# Patient Record
Sex: FEMALE | Race: BLACK OR AFRICAN AMERICAN | NOT HISPANIC OR LATINO | Employment: FULL TIME | URBAN - METROPOLITAN AREA
[De-identification: names, ages, dates, MRNs, and addresses within clinical notes are randomized per-mention and may not be internally consistent; named-entity substitution may affect disease eponyms.]

---

## 2018-08-23 ENCOUNTER — HISTORICAL (OUTPATIENT)
Dept: RADIOLOGY | Facility: HOSPITAL | Age: 28
End: 2018-08-23

## 2019-02-14 ENCOUNTER — HISTORICAL (OUTPATIENT)
Dept: LAB | Facility: HOSPITAL | Age: 29
End: 2019-02-14

## 2019-02-18 ENCOUNTER — HISTORICAL (OUTPATIENT)
Dept: ADMINISTRATIVE | Facility: HOSPITAL | Age: 29
End: 2019-02-18

## 2019-05-09 LAB
ABS NEUT (OLG): 2.7 X10(3)/MCL (ref 1.5–6.9)
ALBUMIN SERPL-MCNC: 3.8 GM/DL (ref 3.4–5)
ALBUMIN/GLOB SERPL: 1 RATIO
ALP SERPL-CCNC: 108 UNIT/L (ref 30–113)
ALT SERPL-CCNC: 29 UNIT/L (ref 10–45)
APTT PPP: 32.4 SECOND(S) (ref 25–35)
AST SERPL-CCNC: 15 UNIT/L (ref 15–37)
BASOPHILS # BLD AUTO: 0 X10(3)/MCL (ref 0–0.1)
BASOPHILS NFR BLD AUTO: 0 % (ref 0–1)
BILIRUB SERPL-MCNC: 0.3 MG/DL (ref 0.1–0.9)
BILIRUBIN DIRECT+TOT PNL SERPL-MCNC: 0.1 MG/DL (ref 0–0.3)
BILIRUBIN DIRECT+TOT PNL SERPL-MCNC: 0.2 MG/DL
BUN SERPL-MCNC: 12 MG/DL (ref 10–20)
CALCIUM SERPL-MCNC: 8.9 MG/DL (ref 8–10.5)
CHLORIDE SERPL-SCNC: 100 MMOL/L (ref 100–108)
CO2 SERPL-SCNC: 28 MMOL/L (ref 21–35)
CREAT SERPL-MCNC: 0.61 MG/DL (ref 0.7–1.3)
EOSINOPHIL # BLD AUTO: 0.2 X10(3)/MCL (ref 0–0.6)
EOSINOPHIL NFR BLD AUTO: 3 % (ref 0–5)
ERYTHROCYTE [DISTWIDTH] IN BLOOD BY AUTOMATED COUNT: 13.6 % (ref 11.5–17)
GLOBULIN SER-MCNC: 3.7 GM/DL
GLUCOSE SERPL-MCNC: 101 MG/DL (ref 75–116)
HCT VFR BLD AUTO: 37.8 % (ref 36–48)
HGB BLD-MCNC: 11.8 GM/DL (ref 12–16)
IMM GRANULOCYTES # BLD AUTO: 0.02 10*3/UL (ref 0–0.02)
IMM GRANULOCYTES NFR BLD AUTO: 0.3 % (ref 0–0.43)
INR PPP: 1 (ref 0–1.2)
LYMPHOCYTES # BLD AUTO: 2.7 X10(3)/MCL (ref 0.5–4.1)
LYMPHOCYTES NFR BLD AUTO: 45 % (ref 15–40)
MCH RBC QN AUTO: 27 PG (ref 27–34)
MCHC RBC AUTO-ENTMCNC: 31 GM/DL (ref 31–36)
MCV RBC AUTO: 87 FL (ref 80–99)
MONOCYTES # BLD AUTO: 0.4 X10(3)/MCL (ref 0–1.1)
MONOCYTES NFR BLD AUTO: 7 % (ref 4–12)
NEUTROPHILS # BLD AUTO: 2.7 X10(3)/MCL (ref 1.5–6.9)
NEUTROPHILS NFR BLD AUTO: 45 % (ref 43–75)
PLATELET # BLD AUTO: 156 X10(3)/MCL (ref 140–400)
PMV BLD AUTO: 9.6 FL (ref 6.8–10)
POTASSIUM SERPL-SCNC: 4.3 MMOL/L (ref 3.6–5.2)
PROT SERPL-MCNC: 7.5 GM/DL (ref 6.4–8.2)
PROTHROMBIN TIME: 10 SECOND(S) (ref 9–12)
RBC # BLD AUTO: 4.33 X10(6)/MCL (ref 4.2–5.4)
SODIUM SERPL-SCNC: 137 MMOL/L (ref 135–145)
WBC # SPEC AUTO: 6.1 X10(3)/MCL (ref 4.5–11.5)

## 2019-05-13 ENCOUNTER — HISTORICAL (OUTPATIENT)
Dept: ANESTHESIOLOGY | Facility: HOSPITAL | Age: 29
End: 2019-05-13

## 2019-05-13 LAB — B-HCG SERPL QL: NEGATIVE

## 2019-05-27 ENCOUNTER — HISTORICAL (OUTPATIENT)
Dept: ANESTHESIOLOGY | Facility: HOSPITAL | Age: 29
End: 2019-05-27

## 2019-05-27 LAB — B-HCG SERPL QL: NEGATIVE

## 2020-11-04 ENCOUNTER — HISTORICAL (OUTPATIENT)
Dept: RADIOLOGY | Facility: HOSPITAL | Age: 30
End: 2020-11-04

## 2020-11-27 ENCOUNTER — HISTORICAL (OUTPATIENT)
Dept: ADMINISTRATIVE | Facility: HOSPITAL | Age: 30
End: 2020-11-27

## 2020-12-07 ENCOUNTER — HISTORICAL (OUTPATIENT)
Dept: RADIOLOGY | Facility: HOSPITAL | Age: 30
End: 2020-12-07

## 2020-12-09 ENCOUNTER — HISTORICAL (OUTPATIENT)
Dept: ADMINISTRATIVE | Facility: HOSPITAL | Age: 30
End: 2020-12-09

## 2022-01-12 ENCOUNTER — HISTORICAL (OUTPATIENT)
Dept: ADMINISTRATIVE | Facility: HOSPITAL | Age: 32
End: 2022-01-12

## 2022-02-24 ENCOUNTER — HISTORICAL (OUTPATIENT)
Dept: ADMINISTRATIVE | Facility: HOSPITAL | Age: 32
End: 2022-02-24

## 2022-03-21 ENCOUNTER — HISTORICAL (OUTPATIENT)
Dept: ADMINISTRATIVE | Facility: HOSPITAL | Age: 32
End: 2022-03-21

## 2022-04-07 ENCOUNTER — HISTORICAL (OUTPATIENT)
Dept: ADMINISTRATIVE | Facility: HOSPITAL | Age: 32
End: 2022-04-07
Payer: MEDICAID

## 2022-04-07 ENCOUNTER — HISTORICAL (OUTPATIENT)
Dept: ADMINISTRATIVE | Facility: HOSPITAL | Age: 32
End: 2022-04-07

## 2022-04-24 VITALS
WEIGHT: 280 LBS | BODY MASS INDEX: 46.65 KG/M2 | HEIGHT: 65 IN | SYSTOLIC BLOOD PRESSURE: 120 MMHG | DIASTOLIC BLOOD PRESSURE: 72 MMHG

## 2022-04-30 NOTE — OP NOTE
ADMITTING DIAGNOSIS:  Blood in the stool.    PROCEDURE:  Colonoscopy to cecum with intubation of ileocecal valve and examination terminal ileum.    POSTOPERATIVE DIAGNOSES:    1. Normal terminal ileum up to 20 cm.  2. Normal colonoscopy.  3. Grade 2 internal hemorrhoids.    PLAN:    1. Follow up in the office to discuss results.  2. Recheck stools in 2 years.  3. Followup colonoscopy in 10 years.    INDICATIONS:  The patient is a 29-year-old  female with a history of seizure disorder, anxiety depressive and issues and suicidal ideation at the age of 17.  She had reflux, iron deficiency anemia, type 2 herpes and is morbidly obese at 5 feet 5 inches, 280 pounds.  The patient was referred to me because had hemoglobin of 12 and hematocrit of 36, consistent with anemia of undetermined etiology.  She had rectal bleeding and required endoscopic evaluation of the colon.    PROCEDURE IN DETAIL:  The patient was brought to the GI suite, underwent sedation and examination of the colon all the way to the cecum with intubation of ileocecal valve.  The terminal ileum was normal up to 10 cm.  Cecum, ascending colon, transverse colon, descending colon were normal.  Rectosigmoid was unremarkable.  Digital exam revealed good tone.  No masses.  Grade 2 internal hemorrhoids were seen.  No other pathology was visualized.  Overall, the patient did very well, had no problems or difficulties, was awakened and sent to recovery in good condition.       I appreciate the consultation referral from Dr. Andrew Merrill and will notify him of my findings.        BBS/MODL   DD: 05/13/2019 1500   DT: 05/13/2019 1510  Job # [id number]    cc: Andrew Merrill MD

## 2022-04-30 NOTE — OP NOTE
ADMITTING DIAGNOSIS:  Symptomatic hemorrhoids with associated bleeding.    PROCEDURE:  PPH stapling of symptomatic internal hemorrhoids grade 2.    INDICATIONS:  The patient is a 29-year-old  female with a history of seizure and anxiety disorders, as well as depression, previous suicidal ideation at the age of 17.  She has anemia iron deficiency type with reflux.  She also has type 2 herpes and is morbidly obese at 5 feet 5 inches, 280 pounds.  The patient had a recent colonoscopy on 05/13/2019 for complaints of hemorrhoidal bleeding and rectal bleeding.  The patient's colonoscopy showed a normal terminal ileum with normal colon and grade 2 hemorrhoids.  The patient is bleeding every time she goes to the bathroom and desires repair of her hemorrhoids.    DESCRIPTION OF PROCEDURE:  The patient was brought to the GI suite, underwent sedation and examination of her anorectum. She had grade 2 internal hemorrhoids and had an anorectal dilator inserted with insertion of an anal probe and then pursestring suturing of the anorectal mucosa circumferentially.  The patient then had a Covidien 33 mm PPH stapler used to perform the mucosal proctectomy.  The anvil was placed in the anorectum.  A Prolene suture was wrapped around the head of the anvil and then placed through the 1st ring of the anvil. Resection of the hemorrhoids was accomplished circumferentially with a good clean mucosal proctectomy.  No muscle was involved.  The patient had no significant bleeding.  Sterile dressings were applied.  No problems were encountered. The patient tolerated the procedure well. Local anesthetic was injected. I appreciate the consultation referral from Dr. Andrew Merrill and will notify him of my findings.        GENEVIEVE/LEANNE   DD: 05/27/2019 0903   DT: 05/27/2019 0917  Job # [id number]    cc: Andrew Merrill M.D.

## 2022-05-26 ENCOUNTER — LAB VISIT (OUTPATIENT)
Dept: LAB | Facility: HOSPITAL | Age: 32
End: 2022-05-26
Attending: OBSTETRICS & GYNECOLOGY
Payer: MEDICAID

## 2022-05-26 DIAGNOSIS — O09.92 SUPERVISION OF HIGH RISK PREGNANCY IN SECOND TRIMESTER: Primary | ICD-10-CM

## 2022-05-26 DIAGNOSIS — Z3A.27 27 WEEKS GESTATION OF PREGNANCY: ICD-10-CM

## 2022-05-26 LAB — GLUCOSE P FAST SERPL-MCNC: 125 MG/DL (ref 74–100)

## 2022-05-26 PROCEDURE — 36415 COLL VENOUS BLD VENIPUNCTURE: CPT

## 2022-05-26 PROCEDURE — 82947 ASSAY GLUCOSE BLOOD QUANT: CPT

## 2022-05-30 ENCOUNTER — HOSPITAL ENCOUNTER (EMERGENCY)
Facility: HOSPITAL | Age: 32
Discharge: HOME | End: 2022-05-30
Payer: MEDICAID

## 2022-05-30 VITALS
HEIGHT: 65 IN | WEIGHT: 290 LBS | BODY MASS INDEX: 48.32 KG/M2 | DIASTOLIC BLOOD PRESSURE: 69 MMHG | HEART RATE: 96 BPM | RESPIRATION RATE: 20 BRPM | SYSTOLIC BLOOD PRESSURE: 120 MMHG | TEMPERATURE: 98 F

## 2022-05-30 PROCEDURE — 99284 EMERGENCY DEPT VISIT MOD MDM: CPT

## 2022-05-30 NOTE — ED PROVIDER NOTES
"       BERNARDO NOTE     Admit Date: 2022  BERNARDO Physician: Andrew Lynne  Primary OBGYN: Dr. Andrew Merrill    Admit Diagnosis/Chief Complaint: Hip pain for 1 month left greater than right    Chief Complaint   Patient presents with    Hip Pain       Hospital Course:  Hill Brink is a 32 y.o.  at 28w4d presents complaining of increasing pain in her left hip which is now spread to her right.  Patient reports the pain has been present for over a month but feels it is getting worse.  She feels like she has increasing pain with lifting her left leg.  Patient reports that she has had hip problems with discomfort in the past and has been to see a chiropractor for same..    Pregnancy is complicated by a history of seizure disorders.  She is under the care of Maternal-Fetal Medicine as well as her primary OB.    Patient denies vaginal bleeding, leakage of fluid, contractions and dysuria.  Fetal Movement: normal.    Review of Systems    /69 (BP Location: Left arm, Patient Position: Sitting)   Pulse 96   Temp 97.6 °F (36.4 °C) (Oral)   Resp 20   Ht 5' 5" (1.651 m)   Wt 131.5 kg (290 lb)   Breastfeeding No   BMI 48.26 kg/m²   Temp:  [97.6 °F (36.4 °C)] 97.6 °F (36.4 °C)  Pulse:  [96] 96  Resp:  [20] 20  BP: (120)/(69) 120/69    General: in no apparent distress alert oriented times 3  Cardiovascular: regular rate and rhythm no murmurs  Respiratory: clear to auscultation, no wheezes, rales or rhonchi, symmetric air entry  Abdominal: fundus soft, nontender 33 weeks size FHT present  Back: lumbar tenderness present CVA tenderness none  Extremeties no redness or tenderness in the calves or thighs no edema    SSE:   SVE:  Deferred      FHT: Category 1  TOCO:  Isolated contraction noted    Medical Decision Making:  Patient was notified that hip pain was likely secondary to hormonal effect of pregnancy exacerbating a coexistent condition.  She was instructed to discuss this with her primary OB at their next visit " which should be within the next week.    LABS:   No results found for this or any previous visit (from the past 24 hour(s)).  [unfilled]     Imaging Results    None          ASSESMENT: Hill Brink is a 32 y.o.   at 28w4d with hip pain associated with pregnancy.    Discharge Diagnosis:  Pregnancy at 28 weeks.  Hip pain.    Status:Stable    Disposition:  discharged to home    Medications:   Extra-strength Tylenol as needed.    Patient Instructions:   Current Discharge Medication List      CONTINUE these medications which have NOT CHANGED    Details   busPIRone (BUSPAR) 10 MG tablet Take 10 mg by mouth 3 (three) times daily as needed.      FEROSUL 325 mg (65 mg iron) Tab tablet Take by mouth once daily.      folic acid (FOLVITE) 1 MG tablet Take 1,000 mcg by mouth once daily.      levETIRAcetam (KEPPRA) 1000 MG tablet Take 1,000 mg by mouth 2 (two) times daily.      loratadine (CLARITIN) 10 mg tablet Take 10 mg by mouth once daily.      prenatal vit-iron fum-folic ac 28 mg iron- 800 mcg Tab Take by mouth.             - Pt was given routine pregnancy instructions including to return to triage if she had any vaginal bleeding (other than spotting for the next 48hrs), any loss of fluid like her water broke, decreased fetal movement, or contractions Q 5min lasting for 2 or more hours. Pt was also instructed to drink copious water. Patient voiced understanding of all these instructions and was subsequently discharged home.    She will follow up with her primary OB.    No discharge procedures on file.    Andrew Lynne MD  OB-GYN Hospitalist       Andrew Lynne MD  [phone]

## 2022-06-09 DIAGNOSIS — M25.559 HIP PAIN: Primary | ICD-10-CM

## 2022-06-09 DIAGNOSIS — M54.30 SCIATIC PAIN: ICD-10-CM

## 2022-06-23 ENCOUNTER — LAB VISIT (OUTPATIENT)
Dept: LAB | Facility: HOSPITAL | Age: 32
End: 2022-06-23
Attending: OBSTETRICS & GYNECOLOGY
Payer: MEDICAID

## 2022-06-23 DIAGNOSIS — Z34.83 PRENATAL CARE, SUBSEQUENT PREGNANCY, THIRD TRIMESTER: ICD-10-CM

## 2022-06-23 LAB
BASOPHILS # BLD AUTO: 0.04 X10(3)/MCL (ref 0–0.2)
BASOPHILS NFR BLD AUTO: 0.5 %
EOSINOPHIL # BLD AUTO: 0.09 X10(3)/MCL (ref 0–0.9)
EOSINOPHIL NFR BLD AUTO: 1 %
ERYTHROCYTE [DISTWIDTH] IN BLOOD BY AUTOMATED COUNT: 13.4 % (ref 11.5–17)
HCT VFR BLD AUTO: 36.9 % (ref 37–47)
HGB BLD-MCNC: 11.8 GM/DL (ref 12–16)
IMM GRANULOCYTES # BLD AUTO: 0.05 X10(3)/MCL (ref 0–0.02)
IMM GRANULOCYTES NFR BLD AUTO: 0.6 % (ref 0–0.43)
LYMPHOCYTES # BLD AUTO: 2.22 X10(3)/MCL (ref 0.6–4.6)
LYMPHOCYTES NFR BLD AUTO: 25.8 %
MCH RBC QN AUTO: 26.8 PG (ref 27–31)
MCHC RBC AUTO-ENTMCNC: 32 MG/DL (ref 33–36)
MCV RBC AUTO: 83.9 FL (ref 80–94)
MONOCYTES # BLD AUTO: 0.46 X10(3)/MCL (ref 0.1–1.3)
MONOCYTES NFR BLD AUTO: 5.3 %
NEUTROPHILS # BLD AUTO: 5.7 X10(3)/MCL (ref 2.1–9.2)
NEUTROPHILS NFR BLD AUTO: 66.8 %
NRBC BLD AUTO-RTO: 0 %
PLATELET # BLD AUTO: 151 X10(3)/MCL (ref 130–400)
PMV BLD AUTO: 10.5 FL (ref 9.4–12.4)
RBC # BLD AUTO: 4.4 X10(6)/MCL (ref 4.2–5.4)
WBC # SPEC AUTO: 8.6 X10(3)/MCL (ref 4.5–11.5)

## 2022-06-23 PROCEDURE — 85025 COMPLETE CBC W/AUTO DIFF WBC: CPT

## 2022-06-23 PROCEDURE — 36415 COLL VENOUS BLD VENIPUNCTURE: CPT

## 2022-06-27 ENCOUNTER — CLINICAL SUPPORT (OUTPATIENT)
Dept: REHABILITATION | Facility: HOSPITAL | Age: 32
End: 2022-06-27
Payer: MEDICAID

## 2022-06-27 DIAGNOSIS — M25.551 BILATERAL HIP PAIN: Primary | ICD-10-CM

## 2022-06-27 DIAGNOSIS — M25.552 BILATERAL HIP PAIN: Primary | ICD-10-CM

## 2022-06-27 DIAGNOSIS — M54.32 SCIATICA OF LEFT SIDE: ICD-10-CM

## 2022-06-27 PROCEDURE — 97110 THERAPEUTIC EXERCISES: CPT

## 2022-06-27 PROCEDURE — 97161 PT EVAL LOW COMPLEX 20 MIN: CPT

## 2022-06-27 NOTE — PATIENT INSTRUCTIONS
Provided patient with written HEP and instructions to be performed daily. Patient demonstrated good understanding.

## 2022-06-27 NOTE — PLAN OF CARE
[facility]   Physical Therapy Initial Evaluation     Date: 6/27/2022   Name: Hill Retana [facility] Number: [id number]    Therapy Diagnosis:   Encounter Diagnoses   Name Primary?    Bilateral hip pain Yes    Sciatica of left side      Physician: Andrew Merrill MD    Physician Orders: PT Eval and Treat Hip pain /Sciatica  Medical Diagnosis from Referral: Hip/sciatic pain   Evaluation Date: 6/27/2022  Authorization Period Expiration: TBD  Plan of Care Expiration: 8/12/2022  Progress Note Due: 8/03/2022  Visit # / Visits authorized: TBD      Precautions: Second trimaster pregnancy     Time In: 13:05 pm  Time Out: 13:42 pm   Total Appointment Time (timed & untimed codes): 37 minutes      SUBJECTIVE     Date of onset: February 2022    History of current condition - Hill reports: Pain in Bilateral hips Left > Right with radiating pain down to Left knee. Started approximately 3 1/2 months ago when she began having difficulty walking and moving due to L Hip pain. Patient is currently 33 weeks pregnant. Initial bout of Left hip pain began 8 years ago after a pregnancy. Eventually resolved without treatment. Experienced an exacerbation approximately 3 years ago . Received PT for traction which worsened symptoms. Patient then attended chiropractic which did bring some relief but she stopped due to financial constraints.     Falls: N/A    Imaging, none:     Prior Therapy: 3 years ago - PT for traction did not help. Chiropractic did ease symptoms but was stopped due to financial constraints.   Social History:  lives with their family  Occupation: recently completed school. Not working currently   Prior Level of Function: Independent  Current Level of Function: requires assist with some functions due to pain and pregnancy.     Pain:  Current 7/10, worst 9/10, best 5/10   Location: left hip>R   bilateral hip(s)  Description: Aching, Burning, Tingling, Numb, Sharp and Shooting  Aggravating Factors:  walking , standing , sitting   Easing Factors: laying on R side with pillow between knees   - patient takes Tylenol BID to help modulate pain. Was given muscle relaxer, but chooses not to take at this time.     Patients goals: Patient would like to return to pain free status to perform Daily living activities.      Medical History:   Past Medical History:   Diagnosis Date    Seizures        Surgical History:   Hill Brink  has a past surgical history that includes Cholecystectomy.    Medications:   Hill has a current medication list which includes the following prescription(s): buspirone, ferosul, folic acid, levetiracetam, loratadine, and prenatal vit-iron fum-folic ac.    Allergies:   Review of patient's allergies indicates:  No Known Allergies       OBJECTIVE     ROM:    R LE : WNL    L LE : WFL except for  limited hip flexion to 75 degrees     Strength:    R LE : WNL   L LE : 4/5 throughout except for 2+/5 hip flexion    Flexibility:    L LE : Tight Left piriformis     Palpation:    - Tenderness along muscle belly of L piriformis. No reports of pain when palpating area around Left hip joint.    - gentle distraction of L LE relieved pain in L hip area.     Gait: Ambulation without device. Decreased stance phase on L due to pain. No LOB noted.      Lower extremity Functional Scale: 20/80 = 25%      TREATMENT     Total Treatment time (time-based codes) separate from Evaluation: 15 minutes      Hill received the treatments listed below:      therapeutic exercises to develop flexibility and stability for 15 minutes including:   - muscle energy to L LE for correction of L SI joint problem  - gentle distraction to L LE 2 x 60 sec  - gentle hold relax piriformis stretch 3 x 10 secs  - L LE hip rotations in supine x 20 reps      PATIENT EDUCATION AND HOME EXERCISES     Education provided:   - discussed maintaining good pelvic alignment in seated position avoiding twisting and bending.    - continue walking program  at home if pain allows   - continue sleeping position at nighttime with pillow between knees in sidelying R.     Written Home Exercises Provided: yes. Exercises were reviewed and Hill was able to demonstrate them prior to the end of the session.  Hill demonstrated good  understanding of the education provided. See EMR under Patient Instructions for exercises provided during therapy sessions.    ASSESSMENT     Hill is a 32 y.o. female referred to outpatient Physical Therapy with a medical diagnosis of hip/sciatic pain. Patient presents with hip pain L > R radiating down to R knee. Patient is limited in activities requiring squatting, standing or walking long periods, and kneeling. She will benefit from PT to provide stability and strengthening to Left SI joint area.     Patient prognosis is Good.   Patient will benefit from skilled outpatient Physical Therapy to address the deficits stated above and in the chart below, provide patient /family education, and to maximize patientt's level of independence.     Plan of care discussed with patient: Yes  Patient's spiritual, cultural and educational needs considered and patient is agreeable to the plan of care and goals as stated below:     Anticipated Barriers for therapy: Left hip pain, ligamentous changes during final weeks of pregnancy     Short Term Goals (3 Weeks):   1. Pt will be compliant with HEP to supplement PT in decreasing pain with functional mobility.  2. Pt will perform and hold L hip muscle energy  contraction for 3 x 10 seconds  In supine  to demonstrate improved core strength  3. Pt will improve impaired LE MMTs to >/=3/5 to improve strength for functional tasks.  Long Term Goals (6 Weeks):   1. Pt will improve LE Functional Scale score to </= 60% limited to decrease perceived limitation with maintaining/changing body position  2. Pt will improve Left piriformis flexibility as seen by being able to cross left LE over right knee in seated position  3. Pt will  improve impaired LE MMTs to >/=4/5 to improve strength for functional tasks.      PLAN   Plan of care Certification: 6/27/2022 to TBD.    Outpatient Physical Therapy 2 times weekly for 6 weeks to include the following interventions: Manual Therapy and Therapeutic Exercise.     Fernandez Ramírez, PT      I CERTIFY THE NEED FOR THESE SERVICES FURNISHED UNDER THIS PLAN OF TREATMENT AND WHILE UNDER MY CARE   Physician's comments:     Physician's Signature: ___________________________________________________

## 2022-06-28 DIAGNOSIS — M25.559 HIP PAIN: ICD-10-CM

## 2022-06-28 DIAGNOSIS — M54.32 LEFT SIDED SCIATICA: Primary | ICD-10-CM

## 2022-07-07 ENCOUNTER — CLINICAL SUPPORT (OUTPATIENT)
Dept: REHABILITATION | Facility: HOSPITAL | Age: 32
End: 2022-07-07
Payer: MEDICAID

## 2022-07-07 DIAGNOSIS — M54.32 SCIATICA OF LEFT SIDE: Primary | ICD-10-CM

## 2022-07-07 DIAGNOSIS — M25.551 BILATERAL HIP PAIN: ICD-10-CM

## 2022-07-07 DIAGNOSIS — M25.552 BILATERAL HIP PAIN: ICD-10-CM

## 2022-07-07 PROCEDURE — 97110 THERAPEUTIC EXERCISES: CPT | Mod: CQ

## 2022-07-07 NOTE — PROGRESS NOTES
[facility]   Physical Therapy Treatment Note     Name: Hill Retana [place]  Clinic Number: [id number]    Therapy Diagnosis:   Encounter Diagnoses   Name Primary?    Sciatica of left side Yes    Bilateral hip pain      Physician: Andrew Merrill MD    Visit Date: 7/7/2022    Medical Diagnosis from Referral: Hip/sciatic pain   Evaluation Date: 6/27/2022  Authorization Period Expiration: TBD  Plan of Care Expiration: 8/12/2022  Progress Note Due: 8/03/2022  Visit # / Visits authorized: 1/12    PTA Visit #: 1/5     Time In: 1404  Time Out: 1430  Total Billable Time: 26 minutes    SUBJECTIVE     Pt reports: HEP has been helping a great deal, but she still has bouts of pain in the L hip.  She was compliant with home exercise program.  Response to previous treatment: good  Functional change: none    Pain: 5/10  Location: left hip      OBJECTIVE     Objective Measures updated at progress report unless specified.     Treatment     Hill received the treatments listed below:      therapeutic exercises to develop strength and ROM for 26 minutes including:    Piriformis Stretch   2 x 1 min with No resistance  Muscle energy technique for L hip  5 x 5 sec with No resistance   L hip IR / ER   x 20 with AAROM  L hip distraction   2 x 1 min   L hip fallouts  1 x 3 min with No resistance   Soft tissue mobilizations to L piriformis  5 min        Patient Education and Home Exercises     Home Exercises Provided and Patient Education Provided       Written Home Exercises Provided: Patient instructed to cont prior HEP. Exercises were reviewed and Hill was able to demonstrate them prior to the end of the session.  Hill demonstrated good  understanding of the education provided. See EMR under Patient Instructions for exercises provided during therapy sessions    ASSESSMENT     Pt had great difficulty moving while on the mat due to pain. Needs assist with lifting L LE for positioning. Modified MET's to supine with  therapist resistance with good tolerance. Pt had reduced pain after STM to L piriformis.     Aza Is progressing well towards her goals.   Pt prognosis is Fair.     Pt will continue to benefit from skilled outpatient physical therapy to address the deficits listed in the problem list box on initial evaluation, provide pt/family education and to maximize pt's level of independence in the home and community environment.     Pt's spiritual, cultural and educational needs considered and pt agreeable to plan of care and goals.     Anticipated barriers to physical therapy: Pregnancy    Goals:     Short Term Goals (3 Weeks):   1. Pt will be compliant with HEP to supplement PT in decreasing pain with functional mobility.  2. Pt will perform and hold L hip muscle energy  contraction for 3 x 10 seconds  In supine  to demonstrate improved core strength  3. Pt will improve impaired LE MMTs to >/=3/5 to improve strength for functional tasks.  Long Term Goals (6 Weeks):   1. Pt will improve LE Functional Scale score to </= 60% limited to decrease perceived limitation with maintaining/changing body position  2. Pt will improve Left piriformis flexibility as seen by being able to cross left LE over right knee in seated position  3. Pt will improve impaired LE MMTs to >/=4/5 to improve strength for functional tasks.    PLAN     Outpatient Physical Therapy 2 times weekly for 6 weeks to include the following interventions: Manual Therapy and Therapeutic Exercise.    Javi Perez, PTA

## 2022-07-11 ENCOUNTER — CLINICAL SUPPORT (OUTPATIENT)
Dept: REHABILITATION | Facility: HOSPITAL | Age: 32
End: 2022-07-11
Payer: MEDICAID

## 2022-07-11 DIAGNOSIS — M54.32 LEFT SIDED SCIATICA: Primary | ICD-10-CM

## 2022-07-11 PROCEDURE — 97110 THERAPEUTIC EXERCISES: CPT

## 2022-07-11 NOTE — PROGRESS NOTES
[facility]   Physical Therapy Treatment Note     Name: Hill Brink  Clinic Number: [id number]    Therapy Diagnosis:   Encounter Diagnosis   Name Primary?    Left sided sciatica Yes     Physician: Andrew Merrill MD    Visit Date: 7/11/2022    Medical Diagnosis from Referral: Hip/sciatic pain   Evaluation Date: 6/27/2022  Authorization Period Expiration: 8/11/22  Plan of Care Expiration: 8/11/2022  Progress Note Due: 8/03/2022  Visit # / Visits authorized: 2/12    PTA Visit #: 1/5     Time In: 13:04  Time Out: 13:30  Total Billable Time: 26 minutes    SUBJECTIVE     Pt reports:She was compliant with home exercise program. States that last tx helped and she was able to go grocery shopping. Hurting today after trying to do housework yesterday.   Response to previous treatment: good  Functional change: none    Pain: 5/10  Location: left hip . Took Tylenol prior to coming.      OBJECTIVE     Objective Measures updated at progress report unless specified.     Treatment     Hill received the treatments listed below:      therapeutic exercises to develop strength and ROM for 26 minutes including:    Piriformis Stretch   2 x 1 min with No resistance  Muscle energy technique for L hip  5 x 5 sec with No resistance   L hip IR / ER   x 20 with AAROM  L hip distraction   2 x 1 min   L hip fallouts  1 x 3 min with No resistance   Soft tissue mobilizations to L piriformis focusing on trigger points  5 min        Patient Education and Home Exercises     Home Exercises Provided and Patient Education Provided       Written Home Exercises Provided: Patient instructed to cont prior HEP. Exercises were reviewed and Hill was able to demonstrate them prior to the end of the session.  Hill demonstrated good  understanding of the education provided. See EMR under Patient Instructions for exercises provided during therapy sessions    ASSESSMENT   Patient tolerated tx well today. Transitional movements are slow  due to discomfort.     Aza Is progressing well towards her goals.   Pt prognosis is Fair.     Pt will continue to benefit from skilled outpatient physical therapy to address the deficits listed in the problem list box on initial evaluation, provide pt/family education and to maximize pt's level of independence in the home and community environment.     Pt's spiritual, cultural and educational needs considered and pt agreeable to plan of care and goals.     Anticipated barriers to physical therapy: Pregnancy    Goals:     Short Term Goals (3 Weeks):   1. Pt will be compliant with HEP to supplement PT in decreasing pain with functional mobility.- MET  2. Pt will perform and hold L hip muscle energy  contraction for 3 x 10 seconds  In supine  to demonstrate improved core strength- progressing   3. Pt will improve impaired LE MMTs to >/=3/5 to improve strength for functional tasks.- progressing   Long Term Goals (6 Weeks):   1. Pt will improve LE Functional Scale score to </= 60% limited to decrease perceived limitation with maintaining/changing body position  2. Pt will improve Left piriformis flexibility as seen by being able to cross left LE over right knee in seated position  3. Pt will improve impaired LE MMTs to >/=4/5 to improve strength for functional tasks.    PLAN     Outpatient Physical Therapy 2 times weekly for 6 weeks to include the following interventions: Manual Therapy and Therapeutic Exercise.    Fernandez Ramírez, PT

## 2022-07-17 ENCOUNTER — HOSPITAL ENCOUNTER (EMERGENCY)
Facility: HOSPITAL | Age: 32
Discharge: HOME | End: 2022-07-17
Payer: MEDICAID

## 2022-07-17 VITALS
TEMPERATURE: 98 F | SYSTOLIC BLOOD PRESSURE: 130 MMHG | RESPIRATION RATE: 18 BRPM | DIASTOLIC BLOOD PRESSURE: 79 MMHG | HEART RATE: 100 BPM

## 2022-07-17 DIAGNOSIS — R10.13 EPIGASTRIC ABDOMINAL PAIN: Primary | ICD-10-CM

## 2022-07-17 PROCEDURE — 99284 EMERGENCY DEPT VISIT MOD MDM: CPT

## 2022-07-17 NOTE — ED PROVIDER NOTES
Encounter Date: 2022       History     Chief Complaint   Patient presents with    Abdominal Pain     Patient complians of irregular contractions since last night. Patient also reports feeling pressure and hip pain.     HPI  Review of patient's allergies indicates:  No Known Allergies  Past Medical History:   Diagnosis Date    Seizures      Past Surgical History:   Procedure Laterality Date    CHOLECYSTECTOMY       No family history on file.  Social History     Tobacco Use    Smoking status: Never Smoker    Smokeless tobacco: Never Used   Substance Use Topics    Alcohol use: Not Currently    Drug use: Never     Review of Systems    Physical Exam     Initial Vitals [22 1610]   BP Pulse Resp Temp SpO2   130/79 100 18 97.9 °F (36.6 °C) --      MAP       --         Physical Exam    ED Course   Procedures  Labs Reviewed - No data to display       Imaging Results    None          Medications - No data to display                       Clinical Impression:    This  @ 35 3/7 weeks gestation presents to rule out labor. With complaints of pain and contractions.     Tracing: reactive  VE: 1/long/posterior    A/P: @ 35 3/7 weeks, to rule out labor  -no cervical change  -follow up as an outpatient  -pain, bleeding, movement precautions            Juan Francisco Fontaine MD  [phone]

## 2022-07-20 ENCOUNTER — LAB VISIT (OUTPATIENT)
Dept: LAB | Facility: HOSPITAL | Age: 32
End: 2022-07-20
Attending: OBSTETRICS & GYNECOLOGY
Payer: MEDICAID

## 2022-07-20 ENCOUNTER — CLINICAL SUPPORT (OUTPATIENT)
Dept: REHABILITATION | Facility: HOSPITAL | Age: 32
End: 2022-07-20
Payer: MEDICAID

## 2022-07-20 DIAGNOSIS — M25.552 BILATERAL HIP PAIN: ICD-10-CM

## 2022-07-20 DIAGNOSIS — Z34.83 SUPERVISION OF NORMAL INTRAUTERINE PREGNANCY IN MULTIGRAVIDA IN THIRD TRIMESTER: ICD-10-CM

## 2022-07-20 DIAGNOSIS — M25.551 BILATERAL HIP PAIN: ICD-10-CM

## 2022-07-20 DIAGNOSIS — M54.32 SCIATICA OF LEFT SIDE: Primary | ICD-10-CM

## 2022-07-20 LAB
BASOPHILS # BLD AUTO: 0.03 X10(3)/MCL (ref 0–0.2)
BASOPHILS NFR BLD AUTO: 0.3 %
EOSINOPHIL # BLD AUTO: 0.13 X10(3)/MCL (ref 0–0.9)
EOSINOPHIL NFR BLD AUTO: 1.4 %
ERYTHROCYTE [DISTWIDTH] IN BLOOD BY AUTOMATED COUNT: 13.4 % (ref 11.5–17)
HCT VFR BLD AUTO: 41.1 % (ref 37–47)
HGB BLD-MCNC: 13 GM/DL (ref 12–16)
HIV 1+2 AB+HIV1 P24 AG SERPL QL IA: NONREACTIVE
IMM GRANULOCYTES # BLD AUTO: 0.06 X10(3)/MCL (ref 0–0.04)
IMM GRANULOCYTES NFR BLD AUTO: 0.6 %
LYMPHOCYTES # BLD AUTO: 2.76 X10(3)/MCL (ref 0.6–4.6)
LYMPHOCYTES NFR BLD AUTO: 29.6 %
MCH RBC QN AUTO: 26.6 PG (ref 27–31)
MCHC RBC AUTO-ENTMCNC: 31.6 MG/DL (ref 33–36)
MCV RBC AUTO: 84.2 FL (ref 80–94)
MONOCYTES # BLD AUTO: 0.65 X10(3)/MCL (ref 0.1–1.3)
MONOCYTES NFR BLD AUTO: 7 %
NEUTROPHILS # BLD AUTO: 5.7 X10(3)/MCL (ref 2.1–9.2)
NEUTROPHILS NFR BLD AUTO: 61.1 %
NRBC BLD AUTO-RTO: 0 %
PLATELET # BLD AUTO: 157 X10(3)/MCL (ref 130–400)
PMV BLD AUTO: 10.6 FL (ref 7.4–10.4)
RBC # BLD AUTO: 4.88 X10(6)/MCL (ref 4.2–5.4)
T PALLIDUM AB SER QL: NONREACTIVE
WBC # SPEC AUTO: 9.3 X10(3)/MCL (ref 4.5–11.5)

## 2022-07-20 PROCEDURE — 86780 TREPONEMA PALLIDUM: CPT

## 2022-07-20 PROCEDURE — 87389 HIV-1 AG W/HIV-1&-2 AB AG IA: CPT

## 2022-07-20 PROCEDURE — 85025 COMPLETE CBC W/AUTO DIFF WBC: CPT

## 2022-07-20 PROCEDURE — 97110 THERAPEUTIC EXERCISES: CPT | Mod: CQ

## 2022-07-20 PROCEDURE — 36415 COLL VENOUS BLD VENIPUNCTURE: CPT

## 2022-07-20 NOTE — PROGRESS NOTES
[facility]   Physical Therapy Treatment Note     Name: Hill Brink  Clinic Number: [id number]    Therapy Diagnosis:   No diagnosis found.  Physician: Andrew Merrill MD    Visit Date: 7/20/2022    Medical Diagnosis from Referral: Hip/sciatic pain   Evaluation Date: 6/27/2022  Authorization Period Expiration: 8/11/22  Plan of Care Expiration: 8/11/2022  Progress Note Due: 8/03/2022  Visit # / Visits authorized: 3/12    PTA Visit #: 1/5     Time In: 1100   Time Out: 1127  Total Billable Time: 27 minutes    SUBJECTIVE     Pt reports: Not too much pain in the hip today, but is very tired from carrying her child  :She was compliant with home exercise program.   Response to previous treatment: good  Functional change: none    Pain: 3/10  Location: left hip . Took Tylenol prior to coming.      OBJECTIVE     Objective Measures updated at progress report unless specified.     Treatment     Hill received the treatments listed below:      therapeutic exercises to develop strength and ROM for 26 minutes including:    Piriformis Stretch   2 x 1 min with No resistance  Muscle energy technique for L hip  5 x 5 sec with No resistance   L hip IR / ER   x 20 with AAROM  L hip distraction   2 x 1 min   L hip fallouts  1 x 3 min with No resistance   Soft tissue mobilizations to L piriformis focusing on trigger points  5 min        Patient Education and Home Exercises     Home Exercises Provided and Patient Education Provided       Written Home Exercises Provided: Patient instructed to cont prior HEP. Exercises were reviewed and Hill was able to demonstrate them prior to the end of the session.  Hill demonstrated good  understanding of the education provided. See EMR under Patient Instructions for exercises provided during therapy sessions    ASSESSMENT     Pt Completed all exercises with good effort. She had good relief from manual distraction.      Hill Is progressing well towards her goals.   Pt prognosis  is Fair.     Pt will continue to benefit from skilled outpatient physical therapy to address the deficits listed in the problem list box on initial evaluation, provide pt/family education and to maximize pt's level of independence in the home and community environment.     Pt's spiritual, cultural and educational needs considered and pt agreeable to plan of care and goals.     Anticipated barriers to physical therapy: Pregnancy    Goals:     Short Term Goals (3 Weeks):   1. Pt will be compliant with HEP to supplement PT in decreasing pain with functional mobility.- MET  2. Pt will perform and hold L hip muscle energy  contraction for 3 x 10 seconds  In supine  to demonstrate improved core strength- progressing   3. Pt will improve impaired LE MMTs to >/=3/5 to improve strength for functional tasks.- progressing   Long Term Goals (6 Weeks):   1. Pt will improve LE Functional Scale score to </= 60% limited to decrease perceived limitation with maintaining/changing body position  2. Pt will improve Left piriformis flexibility as seen by being able to cross left LE over right knee in seated position  3. Pt will improve impaired LE MMTs to >/=4/5 to improve strength for functional tasks.    PLAN     Outpatient Physical Therapy 2 times weekly for 6 weeks to include the following interventions: Manual Therapy and Therapeutic Exercise.    Javi Perez, PTA

## 2022-07-26 ENCOUNTER — HOSPITAL ENCOUNTER (INPATIENT)
Facility: HOSPITAL | Age: 32
LOS: 4 days | Discharge: HOME | End: 2022-07-30
Attending: OBSTETRICS & GYNECOLOGY | Admitting: OBSTETRICS & GYNECOLOGY
Payer: MEDICAID

## 2022-07-26 DIAGNOSIS — O13.9: ICD-10-CM

## 2022-07-26 DIAGNOSIS — Z98.890 POST-OPERATIVE STATE: ICD-10-CM

## 2022-07-26 LAB
ABO AND RH: NORMAL
ALBUMIN SERPL-MCNC: 2.8 GM/DL (ref 3.5–5)
ALBUMIN/GLOB SERPL: 0.8 RATIO (ref 1.1–2)
ALP SERPL-CCNC: 150 UNIT/L (ref 40–150)
ALT SERPL-CCNC: 17 UNIT/L (ref 0–55)
AST SERPL-CCNC: 14 UNIT/L (ref 5–34)
BASOPHILS # BLD AUTO: 0.03 X10(3)/MCL (ref 0–0.2)
BASOPHILS NFR BLD AUTO: 0.4 %
BILIRUBIN DIRECT+TOT PNL SERPL-MCNC: 0.3 MG/DL
BUN SERPL-MCNC: 6.8 MG/DL (ref 7–18.7)
CALCIUM SERPL-MCNC: 9.2 MG/DL (ref 8.4–10.2)
CHLORIDE SERPL-SCNC: 105 MMOL/L (ref 98–107)
CO2 SERPL-SCNC: 20 MMOL/L (ref 22–29)
CREAT SERPL-MCNC: 0.5 MG/DL (ref 0.55–1.02)
EOSINOPHIL # BLD AUTO: 0.08 X10(3)/MCL (ref 0–0.9)
EOSINOPHIL NFR BLD AUTO: 1 %
ERYTHROCYTE [DISTWIDTH] IN BLOOD BY AUTOMATED COUNT: 13.7 % (ref 11.5–17)
GLOBULIN SER-MCNC: 3.3 GM/DL (ref 2.4–3.5)
GLUCOSE SERPL-MCNC: 101 MG/DL (ref 74–100)
HBV SURFACE AG SERPL QL IA: NONREACTIVE
HCT VFR BLD AUTO: 38.9 % (ref 37–47)
HGB BLD-MCNC: 12.4 GM/DL (ref 12–16)
HIV 1+2 AB+HIV1 P24 AG SERPL QL IA: NEGATIVE
IMM GRANULOCYTES # BLD AUTO: 0.05 X10(3)/MCL (ref 0–0.04)
IMM GRANULOCYTES NFR BLD AUTO: 0.6 %
INDIRECT COOMBS GEL: NORMAL
LDH SERPL-CCNC: 144 U/L (ref 125–220)
LYMPHOCYTES # BLD AUTO: 2.17 X10(3)/MCL (ref 0.6–4.6)
LYMPHOCYTES NFR BLD AUTO: 26.9 %
MCH RBC QN AUTO: 26.8 PG (ref 27–31)
MCHC RBC AUTO-ENTMCNC: 31.9 MG/DL (ref 33–36)
MCV RBC AUTO: 84.2 FL (ref 80–94)
MONOCYTES # BLD AUTO: 0.53 X10(3)/MCL (ref 0.1–1.3)
MONOCYTES NFR BLD AUTO: 6.6 %
NEUTROPHILS # BLD AUTO: 5.2 X10(3)/MCL (ref 2.1–9.2)
NEUTROPHILS NFR BLD AUTO: 64.5 %
NRBC BLD AUTO-RTO: 0 %
PLATELET # BLD AUTO: 146 X10(3)/MCL (ref 130–400)
PMV BLD AUTO: 11 FL (ref 7.4–10.4)
POTASSIUM SERPL-SCNC: 3.9 MMOL/L (ref 3.5–5.1)
PROT SERPL-MCNC: 6.1 GM/DL (ref 6.4–8.3)
RBC # BLD AUTO: 4.62 X10(6)/MCL (ref 4.2–5.4)
RPR: NON REACTIVE
SODIUM SERPL-SCNC: 136 MMOL/L (ref 136–145)
T PALLIDUM AB SER QL: NONREACTIVE
URATE SERPL-MCNC: 5.3 MG/DL (ref 2.6–6)
WBC # SPEC AUTO: 8.1 X10(3)/MCL (ref 4.5–11.5)

## 2022-07-26 PROCEDURE — 36415 COLL VENOUS BLD VENIPUNCTURE: CPT | Performed by: OBSTETRICS & GYNECOLOGY

## 2022-07-26 PROCEDURE — 86900 BLOOD TYPING SEROLOGIC ABO: CPT | Performed by: OBSTETRICS & GYNECOLOGY

## 2022-07-26 PROCEDURE — 63600175 PHARM REV CODE 636 W HCPCS: Performed by: OBSTETRICS & GYNECOLOGY

## 2022-07-26 PROCEDURE — 51702 INSERT TEMP BLADDER CATH: CPT

## 2022-07-26 PROCEDURE — 86850 RBC ANTIBODY SCREEN: CPT | Performed by: OBSTETRICS & GYNECOLOGY

## 2022-07-26 PROCEDURE — 80053 COMPREHEN METABOLIC PANEL: CPT | Performed by: OBSTETRICS & GYNECOLOGY

## 2022-07-26 PROCEDURE — 25000003 PHARM REV CODE 250: Performed by: OBSTETRICS & GYNECOLOGY

## 2022-07-26 PROCEDURE — 11000001 HC ACUTE MED/SURG PRIVATE ROOM

## 2022-07-26 PROCEDURE — 86780 TREPONEMA PALLIDUM: CPT | Performed by: OBSTETRICS & GYNECOLOGY

## 2022-07-26 PROCEDURE — 87340 HEPATITIS B SURFACE AG IA: CPT | Performed by: OBSTETRICS & GYNECOLOGY

## 2022-07-26 PROCEDURE — 84550 ASSAY OF BLOOD/URIC ACID: CPT | Performed by: OBSTETRICS & GYNECOLOGY

## 2022-07-26 PROCEDURE — 83615 LACTATE (LD) (LDH) ENZYME: CPT | Performed by: OBSTETRICS & GYNECOLOGY

## 2022-07-26 PROCEDURE — 86901 BLOOD TYPING SEROLOGIC RH(D): CPT | Performed by: OBSTETRICS & GYNECOLOGY

## 2022-07-26 PROCEDURE — 85025 COMPLETE CBC W/AUTO DIFF WBC: CPT | Performed by: OBSTETRICS & GYNECOLOGY

## 2022-07-26 RX ORDER — MISOPROSTOL 100 UG/1
800 TABLET ORAL
Status: DISCONTINUED | OUTPATIENT
Start: 2022-07-26 | End: 2022-07-28

## 2022-07-26 RX ORDER — SODIUM CHLORIDE, SODIUM LACTATE, POTASSIUM CHLORIDE, CALCIUM CHLORIDE 600; 310; 30; 20 MG/100ML; MG/100ML; MG/100ML; MG/100ML
1000 INJECTION, SOLUTION INTRAVENOUS CONTINUOUS
Status: DISCONTINUED | OUTPATIENT
Start: 2022-07-26 | End: 2022-07-30 | Stop reason: HOSPADM

## 2022-07-26 RX ORDER — BUTORPHANOL TARTRATE 2 MG/ML
1 INJECTION INTRAMUSCULAR; INTRAVENOUS
Status: DISCONTINUED | OUTPATIENT
Start: 2022-07-26 | End: 2022-07-28

## 2022-07-26 RX ORDER — LIDOCAINE HYDROCHLORIDE 10 MG/ML
10 INJECTION INFILTRATION; PERINEURAL ONCE AS NEEDED
Status: DISCONTINUED | OUTPATIENT
Start: 2022-07-26 | End: 2022-07-28

## 2022-07-26 RX ORDER — OXYTOCIN/RINGER'S LACTATE 30/500 ML
95 PLASTIC BAG, INJECTION (ML) INTRAVENOUS ONCE
Status: DISCONTINUED | OUTPATIENT
Start: 2022-07-26 | End: 2022-07-28

## 2022-07-26 RX ORDER — LEVETIRACETAM 500 MG/1
1000 TABLET ORAL 2 TIMES DAILY
Status: DISCONTINUED | OUTPATIENT
Start: 2022-07-26 | End: 2022-07-27

## 2022-07-26 RX ORDER — MAGNESIUM SULFATE HEPTAHYDRATE 40 MG/ML
2 INJECTION, SOLUTION INTRAVENOUS CONTINUOUS
Status: DISCONTINUED | OUTPATIENT
Start: 2022-07-26 | End: 2022-07-30 | Stop reason: HOSPADM

## 2022-07-26 RX ORDER — SODIUM CHLORIDE, SODIUM LACTATE, POTASSIUM CHLORIDE, CALCIUM CHLORIDE 600; 310; 30; 20 MG/100ML; MG/100ML; MG/100ML; MG/100ML
INJECTION, SOLUTION INTRAVENOUS CONTINUOUS
Status: DISCONTINUED | OUTPATIENT
Start: 2022-07-26 | End: 2022-07-28

## 2022-07-26 RX ORDER — SIMETHICONE 80 MG
1 TABLET,CHEWABLE ORAL 4 TIMES DAILY PRN
Status: DISCONTINUED | OUTPATIENT
Start: 2022-07-26 | End: 2022-07-27

## 2022-07-26 RX ORDER — BUTORPHANOL TARTRATE 2 MG/ML
2 INJECTION INTRAMUSCULAR; INTRAVENOUS
Status: DISCONTINUED | OUTPATIENT
Start: 2022-07-26 | End: 2022-07-28

## 2022-07-26 RX ORDER — PROCHLORPERAZINE EDISYLATE 5 MG/ML
5 INJECTION INTRAMUSCULAR; INTRAVENOUS EVERY 6 HOURS PRN
Status: DISCONTINUED | OUTPATIENT
Start: 2022-07-26 | End: 2022-07-28

## 2022-07-26 RX ORDER — CALCIUM GLUCONATE 98 MG/ML
1 INJECTION, SOLUTION INTRAVENOUS
Status: DISCONTINUED | OUTPATIENT
Start: 2022-07-26 | End: 2022-07-30 | Stop reason: HOSPADM

## 2022-07-26 RX ORDER — BUTALBITAL, ACETAMINOPHEN AND CAFFEINE 50; 325; 40 MG/1; MG/1; MG/1
1 TABLET ORAL EVERY 4 HOURS PRN
Status: DISCONTINUED | OUTPATIENT
Start: 2022-07-26 | End: 2022-07-30 | Stop reason: HOSPADM

## 2022-07-26 RX ORDER — DIPHENOXYLATE HYDROCHLORIDE AND ATROPINE SULFATE 2.5; .025 MG/1; MG/1
1 TABLET ORAL 4 TIMES DAILY PRN
Status: DISCONTINUED | OUTPATIENT
Start: 2022-07-26 | End: 2022-07-28

## 2022-07-26 RX ORDER — CALCIUM CARBONATE 200(500)MG
500 TABLET,CHEWABLE ORAL 3 TIMES DAILY PRN
Status: DISCONTINUED | OUTPATIENT
Start: 2022-07-26 | End: 2022-07-28

## 2022-07-26 RX ORDER — METHYLERGONOVINE MALEATE 0.2 MG/ML
200 INJECTION INTRAVENOUS
Status: DISCONTINUED | OUTPATIENT
Start: 2022-07-26 | End: 2022-07-28

## 2022-07-26 RX ORDER — CARBOPROST TROMETHAMINE 250 UG/ML
250 INJECTION, SOLUTION INTRAMUSCULAR
Status: DISCONTINUED | OUTPATIENT
Start: 2022-07-26 | End: 2022-07-28

## 2022-07-26 RX ORDER — OXYTOCIN/RINGER'S LACTATE 30/500 ML
334 PLASTIC BAG, INJECTION (ML) INTRAVENOUS ONCE
Status: DISCONTINUED | OUTPATIENT
Start: 2022-07-26 | End: 2022-07-28

## 2022-07-26 RX ORDER — ONDANSETRON 4 MG/1
8 TABLET, ORALLY DISINTEGRATING ORAL EVERY 8 HOURS PRN
Status: DISCONTINUED | OUTPATIENT
Start: 2022-07-26 | End: 2022-07-28

## 2022-07-26 RX ORDER — ACETAMINOPHEN 500 MG
1000 TABLET ORAL EVERY 6 HOURS PRN
Status: DISCONTINUED | OUTPATIENT
Start: 2022-07-26 | End: 2022-07-30 | Stop reason: HOSPADM

## 2022-07-26 RX ADMIN — LEVETIRACETAM 1000 MG: 500 TABLET, FILM COATED ORAL at 09:07

## 2022-07-26 RX ADMIN — MAGNESIUM SULFATE IN WATER 2 G/HR: 40 INJECTION, SOLUTION INTRAVENOUS at 03:07

## 2022-07-26 RX ADMIN — BUTALBITAL, ACETAMINOPHEN AND CAFFEINE 1 TABLET: 50; 325; 40 TABLET ORAL at 08:07

## 2022-07-26 RX ADMIN — DINOPROSTONE 10 MG: 10 INSERT VAGINAL at 03:07

## 2022-07-26 RX ADMIN — ACETAMINOPHEN 1000 MG: 500 TABLET, FILM COATED ORAL at 12:07

## 2022-07-26 NOTE — H&P
[facility] - Labor and Delivery  Obstetrics  History & Physical    Patient Name: Hill Brink  MRN: [id number]  Admission Date: 2022  Primary Care Provider: Jamison Davila DO    Subjective:     Principal Problem:ghtn w complex features and possible superimposed pre e headache    History of Present Illness: see above    Obstetric HPI:  Patient reports None contractions, active fetal movement, No vaginal bleeding , No loss of fluid     This pregnancy has been complicated by epilepsy, headaches, htn    OB History    Para Term  AB Living   5 3 3 0 1 3   SAB IAB Ectopic Multiple Live Births   1 0 0 0 3      # Outcome Date GA Lbr Bao/2nd Weight Sex Delivery Anes PTL Lv   5 Current            4 SAB 2021        FD   3 Term 14     Vag-Spont   BATOOL   2 Term 10/10/11 39w0d    Vag-Spont   BATOOL   1 Term 03/22/10     Vag-Spont   BATOOL     Past Medical History:   Diagnosis Date    Seizures      Past Surgical History:   Procedure Laterality Date    CHOLECYSTECTOMY      hemrroid surgery         PTA Medications   Medication Sig    FEROSUL 325 mg (65 mg iron) Tab tablet Take by mouth once daily.    folic acid (FOLVITE) 1 MG tablet Take 1,000 mcg by mouth once daily.    levETIRAcetam (KEPPRA) 1000 MG tablet Take 1,000 mg by mouth 2 (two) times daily.    prenatal vit-iron fum-folic ac 28 mg iron- 800 mcg Tab Take by mouth.    [DISCONTINUED] busPIRone (BUSPAR) 10 MG tablet Take 10 mg by mouth 3 (three) times daily as needed.    [DISCONTINUED] loratadine (CLARITIN) 10 mg tablet Take 10 mg by mouth once daily.       Review of patient's allergies indicates:  No Known Allergies     Family History     Problem Relation (Age of Onset)    Colon cancer Maternal Grandmother    Hyperlipidemia Mother        Tobacco Use    Smoking status: Former Smoker    Smokeless tobacco: Former User   Substance and Sexual Activity    Alcohol use: Not Currently    Drug use: Not Currently    Sexual  activity: Yes     Review of Systems   Objective:     Vital Signs (Most Recent):    Vital Signs (24h Range):  BP: (144)/(82) 144/82        There is no height or weight on file to calculate BMI.    FHT: 140Cat 1 (reassuring)  TOCO:  Q 6 minutes    Physical Exam    Cervix:  Dilation:  1  Effacement:  50%  Station: -3  Presentation: Vertex     Significant Labs:  Lab Results   Component Value Date    GROUPTRH O POS 2022    STREPBCULT neg 2022       I have personallly reviewed all pertinent lab results from the last 24 hours.    Assessment/Plan:     32 y.o. female  at 36w5d for:    There are no hospital problems to display for this patient.      Admit for cervidil mag  ivf  Monitoring  mfm (I spoke w dr grossman about pt clinical course today)    Andrew Merrill MD  Obstetrics  [facility] - Labor and Delivery

## 2022-07-27 ENCOUNTER — ANESTHESIA (OUTPATIENT)
Dept: OBSTETRICS AND GYNECOLOGY | Facility: HOSPITAL | Age: 32
End: 2022-07-27
Payer: MEDICAID

## 2022-07-27 ENCOUNTER — ANESTHESIA EVENT (OUTPATIENT)
Dept: OBSTETRICS AND GYNECOLOGY | Facility: HOSPITAL | Age: 32
End: 2022-07-27
Payer: MEDICAID

## 2022-07-27 PROCEDURE — 37000009 HC ANESTHESIA EA ADD 15 MINS: Performed by: OBSTETRICS & GYNECOLOGY

## 2022-07-27 PROCEDURE — 36004725 HC OB OR TIME LEV III - EA ADD 15 MIN: Performed by: OBSTETRICS & GYNECOLOGY

## 2022-07-27 PROCEDURE — C1765 ADHESION BARRIER: HCPCS | Performed by: OBSTETRICS & GYNECOLOGY

## 2022-07-27 PROCEDURE — 11000001 HC ACUTE MED/SURG PRIVATE ROOM

## 2022-07-27 PROCEDURE — 62326 NJX INTERLAMINAR LMBR/SAC: CPT | Performed by: ANESTHESIOLOGY

## 2022-07-27 PROCEDURE — 25000003 PHARM REV CODE 250: Performed by: OBSTETRICS & GYNECOLOGY

## 2022-07-27 PROCEDURE — 25000003 PHARM REV CODE 250: Performed by: ANESTHESIOLOGY

## 2022-07-27 PROCEDURE — 63600175 PHARM REV CODE 636 W HCPCS: Performed by: ANESTHESIOLOGY

## 2022-07-27 PROCEDURE — 63600175 PHARM REV CODE 636 W HCPCS: Performed by: OBSTETRICS & GYNECOLOGY

## 2022-07-27 PROCEDURE — 72100002 HC LABOR CARE, 1ST 8 HOURS

## 2022-07-27 PROCEDURE — 36004724 HC OB OR TIME LEV III - 1ST 15 MIN: Performed by: OBSTETRICS & GYNECOLOGY

## 2022-07-27 PROCEDURE — 72100003 HC LABOR CARE, EA. ADDL. 8 HRS

## 2022-07-27 PROCEDURE — 25000003 PHARM REV CODE 250: Performed by: NURSE ANESTHETIST, CERTIFIED REGISTERED

## 2022-07-27 PROCEDURE — 36415 COLL VENOUS BLD VENIPUNCTURE: CPT | Performed by: OBSTETRICS & GYNECOLOGY

## 2022-07-27 PROCEDURE — 37000008 HC ANESTHESIA 1ST 15 MINUTES: Performed by: OBSTETRICS & GYNECOLOGY

## 2022-07-27 PROCEDURE — 63600175 PHARM REV CODE 636 W HCPCS: Performed by: NURSE ANESTHETIST, CERTIFIED REGISTERED

## 2022-07-27 PROCEDURE — 71000033 HC RECOVERY, INTIAL HOUR: Performed by: OBSTETRICS & GYNECOLOGY

## 2022-07-27 PROCEDURE — 27000492 HC SLEEVE, SCD T/L

## 2022-07-27 DEVICE — BARRIER INTERCEED 3X4 ST DIS: Type: IMPLANTABLE DEVICE | Site: ABDOMEN | Status: FUNCTIONAL

## 2022-07-27 RX ORDER — SODIUM CHLORIDE, SODIUM LACTATE, POTASSIUM CHLORIDE, CALCIUM CHLORIDE 600; 310; 30; 20 MG/100ML; MG/100ML; MG/100ML; MG/100ML
INJECTION, SOLUTION INTRAVENOUS CONTINUOUS
Status: DISCONTINUED | OUTPATIENT
Start: 2022-07-27 | End: 2022-07-28

## 2022-07-27 RX ORDER — METHYLERGONOVINE MALEATE 0.2 MG/ML
200 INJECTION INTRAVENOUS
Status: DISCONTINUED | OUTPATIENT
Start: 2022-07-27 | End: 2022-07-28

## 2022-07-27 RX ORDER — AMOXICILLIN 250 MG
1 CAPSULE ORAL NIGHTLY PRN
Status: DISCONTINUED | OUTPATIENT
Start: 2022-07-27 | End: 2022-07-30 | Stop reason: HOSPADM

## 2022-07-27 RX ORDER — OXYTOCIN/RINGER'S LACTATE 30/500 ML
334 PLASTIC BAG, INJECTION (ML) INTRAVENOUS ONCE
Status: COMPLETED | OUTPATIENT
Start: 2022-07-27 | End: 2022-07-27

## 2022-07-27 RX ORDER — CEFAZOLIN SODIUM 1 G/3ML
INJECTION, POWDER, FOR SOLUTION INTRAMUSCULAR; INTRAVENOUS
Status: DISCONTINUED | OUTPATIENT
Start: 2022-07-27 | End: 2022-07-27

## 2022-07-27 RX ORDER — MISOPROSTOL 100 UG/1
800 TABLET ORAL
Status: DISCONTINUED | OUTPATIENT
Start: 2022-07-27 | End: 2022-07-28

## 2022-07-27 RX ORDER — OXYCODONE AND ACETAMINOPHEN 5; 325 MG/1; MG/1
1 TABLET ORAL EVERY 4 HOURS PRN
Status: DISCONTINUED | OUTPATIENT
Start: 2022-07-27 | End: 2022-07-30 | Stop reason: HOSPADM

## 2022-07-27 RX ORDER — EPHEDRINE SULFATE 50 MG/ML
10 INJECTION, SOLUTION INTRAVENOUS ONCE AS NEEDED
Status: DISCONTINUED | OUTPATIENT
Start: 2022-07-27 | End: 2022-07-28

## 2022-07-27 RX ORDER — PRENATAL WITH FERROUS FUM AND FOLIC ACID 3080; 920; 120; 400; 22; 1.84; 3; 20; 10; 1; 12; 200; 27; 25; 2 [IU]/1; [IU]/1; MG/1; [IU]/1; MG/1; MG/1; MG/1; MG/1; MG/1; MG/1; UG/1; MG/1; MG/1; MG/1; MG/1
1 TABLET ORAL DAILY
Status: DISCONTINUED | OUTPATIENT
Start: 2022-07-28 | End: 2022-07-30 | Stop reason: HOSPADM

## 2022-07-27 RX ORDER — PROCHLORPERAZINE EDISYLATE 5 MG/ML
5 INJECTION INTRAMUSCULAR; INTRAVENOUS EVERY 6 HOURS PRN
Status: DISCONTINUED | OUTPATIENT
Start: 2022-07-27 | End: 2022-07-30 | Stop reason: HOSPADM

## 2022-07-27 RX ORDER — OXYTOCIN/RINGER'S LACTATE 30/500 ML
95 PLASTIC BAG, INJECTION (ML) INTRAVENOUS ONCE
Status: DISCONTINUED | OUTPATIENT
Start: 2022-07-27 | End: 2022-07-28

## 2022-07-27 RX ORDER — BUPIVACAINE HYDROCHLORIDE 2.5 MG/ML
INJECTION, SOLUTION EPIDURAL; INFILTRATION; INTRACAUDAL
Status: COMPLETED | OUTPATIENT
Start: 2022-07-27 | End: 2022-07-27

## 2022-07-27 RX ORDER — ADHESIVE BANDAGE
30 BANDAGE TOPICAL 2 TIMES DAILY PRN
Status: DISCONTINUED | OUTPATIENT
Start: 2022-07-28 | End: 2022-07-30 | Stop reason: HOSPADM

## 2022-07-27 RX ORDER — FENTANYL CITRATE 50 UG/ML
INJECTION, SOLUTION INTRAMUSCULAR; INTRAVENOUS
Status: DISCONTINUED | OUTPATIENT
Start: 2022-07-27 | End: 2022-07-27

## 2022-07-27 RX ORDER — LEVETIRACETAM 500 MG/1
1000 TABLET ORAL 2 TIMES DAILY
Status: DISCONTINUED | OUTPATIENT
Start: 2022-07-27 | End: 2022-07-30 | Stop reason: HOSPADM

## 2022-07-27 RX ORDER — OXYCODONE AND ACETAMINOPHEN 10; 325 MG/1; MG/1
1 TABLET ORAL EVERY 4 HOURS PRN
Status: DISCONTINUED | OUTPATIENT
Start: 2022-07-27 | End: 2022-07-30 | Stop reason: HOSPADM

## 2022-07-27 RX ORDER — ONDANSETRON 2 MG/ML
INJECTION INTRAMUSCULAR; INTRAVENOUS
Status: DISCONTINUED | OUTPATIENT
Start: 2022-07-27 | End: 2022-07-27

## 2022-07-27 RX ORDER — OXYTOCIN/RINGER'S LACTATE 30/500 ML
95 PLASTIC BAG, INJECTION (ML) INTRAVENOUS ONCE
Status: DISCONTINUED | OUTPATIENT
Start: 2022-07-27 | End: 2022-07-30 | Stop reason: HOSPADM

## 2022-07-27 RX ORDER — KETOROLAC TROMETHAMINE 30 MG/ML
INJECTION, SOLUTION INTRAMUSCULAR; INTRAVENOUS
Status: DISCONTINUED | OUTPATIENT
Start: 2022-07-27 | End: 2022-07-27

## 2022-07-27 RX ORDER — ONDANSETRON 2 MG/ML
4 INJECTION INTRAMUSCULAR; INTRAVENOUS ONCE AS NEEDED
Status: DISCONTINUED | OUTPATIENT
Start: 2022-07-27 | End: 2022-07-28

## 2022-07-27 RX ORDER — ACETAMINOPHEN 10 MG/ML
INJECTION, SOLUTION INTRAVENOUS
Status: DISCONTINUED | OUTPATIENT
Start: 2022-07-27 | End: 2022-07-27

## 2022-07-27 RX ORDER — DOCUSATE SODIUM 100 MG/1
200 CAPSULE, LIQUID FILLED ORAL 2 TIMES DAILY
Status: DISCONTINUED | OUTPATIENT
Start: 2022-07-27 | End: 2022-07-30 | Stop reason: HOSPADM

## 2022-07-27 RX ORDER — HYDROMORPHONE HYDROCHLORIDE 2 MG/ML
1 INJECTION, SOLUTION INTRAMUSCULAR; INTRAVENOUS; SUBCUTANEOUS EVERY 4 HOURS PRN
Status: DISCONTINUED | OUTPATIENT
Start: 2022-07-27 | End: 2022-07-30 | Stop reason: HOSPADM

## 2022-07-27 RX ORDER — OXYTOCIN/RINGER'S LACTATE 30/500 ML
PLASTIC BAG, INJECTION (ML) INTRAVENOUS
Status: DISCONTINUED | OUTPATIENT
Start: 2022-07-27 | End: 2022-07-27

## 2022-07-27 RX ORDER — SIMETHICONE 80 MG
1 TABLET,CHEWABLE ORAL EVERY 6 HOURS PRN
Status: DISCONTINUED | OUTPATIENT
Start: 2022-07-27 | End: 2022-07-30 | Stop reason: HOSPADM

## 2022-07-27 RX ORDER — SODIUM CITRATE AND CITRIC ACID MONOHYDRATE 334; 500 MG/5ML; MG/5ML
30 SOLUTION ORAL ONCE
Status: COMPLETED | OUTPATIENT
Start: 2022-07-27 | End: 2022-07-27

## 2022-07-27 RX ORDER — FENTANYL/BUPIVACAINE/NS/PF 2-1250MCG
PLASTIC BAG, INJECTION (ML) INJECTION CONTINUOUS
Status: DISCONTINUED | OUTPATIENT
Start: 2022-07-27 | End: 2022-07-28

## 2022-07-27 RX ORDER — CARBOPROST TROMETHAMINE 250 UG/ML
250 INJECTION, SOLUTION INTRAMUSCULAR
Status: DISCONTINUED | OUTPATIENT
Start: 2022-07-27 | End: 2022-07-28

## 2022-07-27 RX ORDER — DIPHENHYDRAMINE HCL 25 MG
25 CAPSULE ORAL EVERY 4 HOURS PRN
Status: DISCONTINUED | OUTPATIENT
Start: 2022-07-27 | End: 2022-07-30 | Stop reason: HOSPADM

## 2022-07-27 RX ORDER — PHENYLEPHRINE HYDROCHLORIDE 10 MG/ML
INJECTION INTRAVENOUS
Status: DISCONTINUED | OUTPATIENT
Start: 2022-07-27 | End: 2022-07-27

## 2022-07-27 RX ORDER — SODIUM CITRATE AND CITRIC ACID MONOHYDRATE 334; 500 MG/5ML; MG/5ML
30 SOLUTION ORAL
Status: DISCONTINUED | OUTPATIENT
Start: 2022-07-27 | End: 2022-07-28

## 2022-07-27 RX ORDER — SODIUM CHLORIDE 0.9 % (FLUSH) 0.9 %
10 SYRINGE (ML) INJECTION
Status: DISCONTINUED | OUTPATIENT
Start: 2022-07-27 | End: 2022-07-30 | Stop reason: HOSPADM

## 2022-07-27 RX ORDER — CEFAZOLIN SODIUM 2 G/50ML
2 SOLUTION INTRAVENOUS ONCE AS NEEDED
Status: DISCONTINUED | OUTPATIENT
Start: 2022-07-27 | End: 2022-07-28

## 2022-07-27 RX ORDER — ONDANSETRON 4 MG/1
8 TABLET, ORALLY DISINTEGRATING ORAL EVERY 8 HOURS PRN
Status: DISCONTINUED | OUTPATIENT
Start: 2022-07-27 | End: 2022-07-30 | Stop reason: HOSPADM

## 2022-07-27 RX ORDER — IBUPROFEN 600 MG/1
600 TABLET ORAL EVERY 6 HOURS
Status: DISCONTINUED | OUTPATIENT
Start: 2022-07-28 | End: 2022-07-30 | Stop reason: HOSPADM

## 2022-07-27 RX ORDER — LIDOCAINE HYDROCHLORIDE 20 MG/ML
INJECTION, SOLUTION EPIDURAL; INFILTRATION; INTRACAUDAL; PERINEURAL
Status: DISCONTINUED | OUTPATIENT
Start: 2022-07-27 | End: 2022-07-27

## 2022-07-27 RX ORDER — OXYTOCIN/RINGER'S LACTATE 30/500 ML
0-30 PLASTIC BAG, INJECTION (ML) INTRAVENOUS CONTINUOUS
Status: DISCONTINUED | OUTPATIENT
Start: 2022-07-27 | End: 2022-07-30 | Stop reason: HOSPADM

## 2022-07-27 RX ORDER — BISACODYL 10 MG
10 SUPPOSITORY, RECTAL RECTAL ONCE AS NEEDED
Status: DISCONTINUED | OUTPATIENT
Start: 2022-07-27 | End: 2022-07-30 | Stop reason: HOSPADM

## 2022-07-27 RX ADMIN — Medication 500 ML: at 08:07

## 2022-07-27 RX ADMIN — MAGNESIUM SULFATE IN WATER 2 G/HR: 40 INJECTION, SOLUTION INTRAVENOUS at 02:07

## 2022-07-27 RX ADMIN — FENTANYL CITRATE 100 MCG: 50 INJECTION, SOLUTION INTRAMUSCULAR; INTRAVENOUS at 08:07

## 2022-07-27 RX ADMIN — PHENYLEPHRINE HYDROCHLORIDE 100 MCG: 10 INJECTION INTRAVENOUS at 08:07

## 2022-07-27 RX ADMIN — ACETAMINOPHEN 1000 MG: 500 TABLET, FILM COATED ORAL at 10:07

## 2022-07-27 RX ADMIN — SODIUM CITRATE AND CITRIC ACID MONOHYDRATE 30 ML: 500; 334 SOLUTION ORAL at 08:07

## 2022-07-27 RX ADMIN — SODIUM CHLORIDE, POTASSIUM CHLORIDE, SODIUM LACTATE AND CALCIUM CHLORIDE 500 ML: 600; 310; 30; 20 INJECTION, SOLUTION INTRAVENOUS at 10:07

## 2022-07-27 RX ADMIN — HYDROMORPHONE HYDROCHLORIDE 1 MG: 2 INJECTION INTRAMUSCULAR; INTRAVENOUS; SUBCUTANEOUS at 11:07

## 2022-07-27 RX ADMIN — CALCIUM CARBONATE (ANTACID) CHEW TAB 500 MG 500 MG: 500 CHEW TAB at 01:07

## 2022-07-27 RX ADMIN — ACETAMINOPHEN 1000 MG: 10 INJECTION, SOLUTION INTRAVENOUS at 08:07

## 2022-07-27 RX ADMIN — LEVETIRACETAM 1000 MG: 500 TABLET, FILM COATED ORAL at 10:07

## 2022-07-27 RX ADMIN — BUTALBITAL, ACETAMINOPHEN AND CAFFEINE 1 TABLET: 50; 325; 40 TABLET ORAL at 05:07

## 2022-07-27 RX ADMIN — Medication 12 ML/HR: at 12:07

## 2022-07-27 RX ADMIN — BUPIVACAINE HYDROCHLORIDE 10 ML: 2.5 INJECTION, SOLUTION EPIDURAL; INFILTRATION; INTRACAUDAL; PERINEURAL at 09:07

## 2022-07-27 RX ADMIN — LIDOCAINE HYDROCHLORIDE 10 ML: 20 INJECTION, SOLUTION EPIDURAL; INFILTRATION; INTRACAUDAL; PERINEURAL at 08:07

## 2022-07-27 RX ADMIN — BUTORPHANOL TARTRATE 2 MG: 2 INJECTION, SOLUTION INTRAMUSCULAR; INTRAVENOUS at 02:07

## 2022-07-27 RX ADMIN — CEFAZOLIN 2 G: 330 INJECTION, POWDER, FOR SOLUTION INTRAMUSCULAR; INTRAVENOUS at 08:07

## 2022-07-27 RX ADMIN — KETOROLAC TROMETHAMINE 30 MG: 30 INJECTION, SOLUTION INTRAMUSCULAR at 09:07

## 2022-07-27 RX ADMIN — Medication 2 MILLI-UNITS/MIN: at 05:07

## 2022-07-27 RX ADMIN — SODIUM CHLORIDE, POTASSIUM CHLORIDE, SODIUM LACTATE AND CALCIUM CHLORIDE: 600; 310; 30; 20 INJECTION, SOLUTION INTRAVENOUS at 08:07

## 2022-07-27 RX ADMIN — LEVETIRACETAM 1000 MG: 500 TABLET, FILM COATED ORAL at 09:07

## 2022-07-27 RX ADMIN — ONDANSETRON 4 MG: 2 INJECTION INTRAMUSCULAR; INTRAVENOUS at 08:07

## 2022-07-27 RX ADMIN — Medication 334 MILLI-UNITS/MIN: at 09:07

## 2022-07-27 NOTE — PROGRESS NOTES
[facility]                       [address] [phone]    PATIENT NAME:       DANIELLE BRINK  YOB: 1990  CSN:                [id number]   MRN:                [id number]  ADMIT DATE:         2022 10:51:00  PHYSICIAN:          Andrew Merrill MD                            PROGRESS NOTE    DATE:      Ms. Brink is a 32-year-old,  5, para 3-0-1-3 at 36 weeks and 6 days, with   gestational hypertension, superimposed pre-eclampsia with scotomas, on mag   sulfate, being induced.  She has made zero cervical change since rupturing her   water this morning.  An intrauterine pressure catheter was just placed at the 3   o'clock position.  The baby is JAYLEN.  It remains high in the pelvis, 3-4 cm, with   a swollen cervix, and the baby is high, -3, -4.     I explained to Ms. Brink the rationale for the intrauterine pressure catheter.    She verbalized understanding and the patient states that this is the longest it   has ever taken for any one of her children.  I explained to Ms. Brink I believe   she has about an 8-1/2-pound baby.  She has had a 9-pound baby years ago.    However, she is on mag sulfate and is an induction.  I explained to her we have   to weigh her health to the baby's health.  She verbalized understanding.  We   will reassess in several hours.  If the same exam, we are going to perform a   .        ______________________________  Andrew Merrill MD    EPE/AQS  DD:  2022  Time:  04:06PM  DT:  2022  Time:  04:38PM  Job #:  [id number]      PROGRESS NOTE

## 2022-07-27 NOTE — ANESTHESIA PROCEDURE NOTES
Epidural    Patient location during procedure: OB   Reason for block: primary anesthetic   Reason for block: labor analgesia requested by patient and obstetrician  Diagnosis: IUP   Start time: 7/27/2022 9:00 AM  Timeout: 7/27/2022 9:00 AM  End time: 7/27/2022 9:22 AM    Staffing  Performing Provider: Alejandro Bledsoe MD  Authorizing Provider: Alejandro Bledsoe MD        Preanesthetic Checklist  Completed: patient identified, IV checked, monitors and equipment checked, pre-op evaluation, anesthesia consent given, hand hygiene performed and patient being monitored  Preparation  Patient position: sitting  Prep: ChloraPrep  Patient monitoring: Blood Pressure  Reason for block: primary anesthetic   Epidural  Skin Wheal: 2 mL  Administration type: continuous  Approach: midline  Interspace: L2-3    Injection technique: IMMANUEL saline  Needle and Epidural Catheter  Needle type: Tuohy   Needle gauge: 17  Needle length: 3.5 inches  Catheter type: multi-orifice  Catheter size: 19 G  Insertion Attempts: 3  Test dose: 3 mL of lidocaine 1.5% with Epi 1-to-200,000  Additional Documentation: negative aspiration for heme and CSF, no paresthesia on injection, incremental injection, no signs/symptoms of IV or SA injection and no significant complaints from patient  Needle localization: anatomical landmarks  Assessment  Ease of block: moderate  Patient's tolerance of the procedure: no complaints and comfortable throughout block  Additional Notes  Initial Attempt at L3-4 then moved to L2-3  Procedure luis well  Satisfactory Analgesia  No inadvertent dural puncture with Tuohy.  Dural puncture not performed with spinal needle    Medications:    Medications: bupivacaine (pf) (MARCAINE) injection 0.25% - Epidural   10 mL - 7/27/2022 9:18:00 AM

## 2022-07-27 NOTE — PLAN OF CARE
Problem: Adult Inpatient Plan of Care  Goal: Plan of Care Review  Outcome: Ongoing, Progressing  Goal: Patient-Specific Goal (Individualized)  Outcome: Ongoing, Progressing  Goal: Absence of Hospital-Acquired Illness or Injury  Outcome: Ongoing, Progressing  Goal: Optimal Comfort and Wellbeing  Outcome: Ongoing, Progressing  Goal: Readiness for Transition of Care  Outcome: Ongoing, Progressing     Problem: Bariatric Environmental Safety  Goal: Safety Maintained with Care  Outcome: Ongoing, Progressing     Problem:  Fall Injury Risk  Goal: Absence of Fall, Infant Drop and Related Injury  Outcome: Ongoing, Progressing     Problem: Infection  Goal: Absence of Infection Signs and Symptoms  Outcome: Ongoing, Progressing

## 2022-07-27 NOTE — ANESTHESIA PREPROCEDURE EVALUATION
"                                                                                                             2022  Hill Brink is a 32 y.o., female   /66   Pulse 92   Temp 36.6 °C (97.9 °F) (Oral)   Resp 18   Ht 5' 5" (1.651 m)   Wt (!) 141.5 kg (312 lb)   SpO2 (!) 94%   Breastfeeding No   BMI 51.92 kg/m²     OB History    Para Term  AB Living   5 3 3   1 3   SAB IAB Ectopic Multiple Live Births   1       3      # Outcome Date GA Lbr Bao/2nd Weight Sex Delivery Anes PTL Lv   5 Current            4 SAB 2021        FD   3 Term 14     Vag-Spont   BATOOL   2 Term 10/10/11 39w0d    Vag-Spont   BATOOL   1 Term 03/22/10     Vag-Spont   BATOOL       Wt Readings from Last 1 Encounters:   22 0700 (!) 141.5 kg (312 lb)       BP Readings from Last 3 Encounters:   22 121/66   22 (!) 144/82   22 123/72       Patient Active Problem List   Diagnosis    Sciatica of left side    Bilateral hip pain       Past Surgical History:   Procedure Laterality Date    CHOLECYSTECTOMY      hemrroid surgery  2018       Social History     Socioeconomic History    Marital status:    Tobacco Use    Smoking status: Former Smoker    Smokeless tobacco: Former User   Substance and Sexual Activity    Alcohol use: Not Currently    Drug use: Not Currently    Sexual activity: Yes         Chemistry        Component Value Date/Time     2022 1136    K 3.9 2022 1136    CO2 20 (L) 2022 1136    BUN 6.8 (L) 2022 1136    CREATININE 0.50 (L) 2022 1136        Component Value Date/Time    CALCIUM 9.2 2022 1136    ALKPHOS 150 2022 1136    AST 14 2022 1136    ALT 17 2022 1136    BILITOT 0.3 2022 1136    EGFRNONAA >60 2021 1504            Lab Results   Component Value Date    WBC 8.1 2022    HGB 12.4 2022    HCT 38.9 2022    MCV 84.2 2022     2022       No results for input(s): PT, " INR, PROTIME, APTT in the last 72 hours.          Seizure Disorder on Keppra  .      Pre-op Assessment          Review of Systems  Anesthesia Hx:  No problems with previous Anesthesia  Denies Family Hx of Anesthesia complications.    Cardiovascular:  Cardiovascular Normal  No Cardiac Complaints   Pulmonary:  Pulmonary Normal No Pulmonary Complaints   Hepatic/GI:   No Current GERD Sx       Physical Exam  General: Alert and Oriented    Airway:  Mallampati: II   Mouth Opening: Normal  TM Distance: Normal  Tongue: Normal  Neck ROM: Normal ROM    Dental:  Intact    Chest/Lungs:  Clear to auscultation, Normal Respiratory Rate    Heart:  Rate: Normal  Rhythm: Regular Rhythm        Anesthesia Plan  Type of Anesthesia, risks & benefits discussed:    Anesthesia Type: Epidural  Intra-op Monitoring Plan: Standard ASA Monitors  Post Op Pain Control Plan: epidural analgesia  Induction:  IV  Airway Plan: Direct  Informed Consent: Informed consent signed with the Patient and all parties understand the risks and agree with anesthesia plan.  All questions answered. Patient consented to blood products? Yes  ASA Score: 2  Day of Surgery Review of History & Physical: H&P Update referred to the surgeon/provider.  Anesthesia Plan Notes: Premedication: Oral Bicitra and Lactated Ringers Preload  Disc placement of SINA, possibility of no/partial relief, risk of headache. Questions entertained, accepted.  Technique: Epidural for labor analgesia - plan to use if functioning well if C/S is required for delivery  Please refer to nursing records for toco fetal heart tone information    Ready For Surgery From Anesthesia Perspective.     .

## 2022-07-28 LAB
APPEARANCE UR: ABNORMAL
BACTERIA #/AREA URNS AUTO: ABNORMAL /HPF
BASOPHILS # BLD AUTO: 0.03 X10(3)/MCL (ref 0–0.2)
BASOPHILS NFR BLD AUTO: 0.3 %
BILIRUB UR QL STRIP.AUTO: NEGATIVE MG/DL
COLOR UR AUTO: ABNORMAL
EOSINOPHIL # BLD AUTO: 0.07 X10(3)/MCL (ref 0–0.9)
EOSINOPHIL NFR BLD AUTO: 0.7 %
ERYTHROCYTE [DISTWIDTH] IN BLOOD BY AUTOMATED COUNT: 13.6 % (ref 11.5–17)
GLUCOSE UR QL STRIP.AUTO: NEGATIVE MG/DL
HCT VFR BLD AUTO: 32 % (ref 37–47)
HGB BLD-MCNC: 10.4 GM/DL (ref 12–16)
IMM GRANULOCYTES # BLD AUTO: 0.06 X10(3)/MCL (ref 0–0.04)
IMM GRANULOCYTES NFR BLD AUTO: 0.6 %
KETONES UR QL STRIP.AUTO: NEGATIVE MG/DL
LEUKOCYTE ESTERASE UR QL STRIP.AUTO: ABNORMAL UNIT/L
LYMPHOCYTES # BLD AUTO: 2.29 X10(3)/MCL (ref 0.6–4.6)
LYMPHOCYTES NFR BLD AUTO: 22.9 %
MCH RBC QN AUTO: 27.1 PG (ref 27–31)
MCHC RBC AUTO-ENTMCNC: 32.5 MG/DL (ref 33–36)
MCV RBC AUTO: 83.3 FL (ref 80–94)
MONOCYTES # BLD AUTO: 0.57 X10(3)/MCL (ref 0.1–1.3)
MONOCYTES NFR BLD AUTO: 5.7 %
NEUTROPHILS # BLD AUTO: 7 X10(3)/MCL (ref 2.1–9.2)
NEUTROPHILS NFR BLD AUTO: 69.8 %
NITRITE UR QL STRIP.AUTO: NEGATIVE
NRBC BLD AUTO-RTO: 0 %
PH UR STRIP.AUTO: 6 [PH]
PLATELET # BLD AUTO: 134 X10(3)/MCL (ref 130–400)
PMV BLD AUTO: 10.7 FL (ref 7.4–10.4)
PROT UR QL STRIP.AUTO: ABNORMAL MG/DL
RBC # BLD AUTO: 3.84 X10(6)/MCL (ref 4.2–5.4)
RBC #/AREA URNS AUTO: 75 /HPF
RBC UR QL AUTO: ABNORMAL UNIT/L
SP GR UR STRIP.AUTO: 1.02 (ref 1–1.03)
SQUAMOUS #/AREA URNS AUTO: <5 /HPF
UROBILINOGEN UR STRIP-ACNC: 1 MG/DL
WBC # SPEC AUTO: 10 X10(3)/MCL (ref 4.5–11.5)
WBC #/AREA URNS AUTO: 10 /HPF

## 2022-07-28 PROCEDURE — 36415 COLL VENOUS BLD VENIPUNCTURE: CPT | Performed by: OBSTETRICS & GYNECOLOGY

## 2022-07-28 PROCEDURE — 11000001 HC ACUTE MED/SURG PRIVATE ROOM

## 2022-07-28 PROCEDURE — 81001 URINALYSIS AUTO W/SCOPE: CPT | Performed by: OBSTETRICS & GYNECOLOGY

## 2022-07-28 PROCEDURE — 25000003 PHARM REV CODE 250: Performed by: STUDENT IN AN ORGANIZED HEALTH CARE EDUCATION/TRAINING PROGRAM

## 2022-07-28 PROCEDURE — 25000003 PHARM REV CODE 250: Performed by: OBSTETRICS & GYNECOLOGY

## 2022-07-28 PROCEDURE — 87088 URINE BACTERIA CULTURE: CPT | Performed by: OBSTETRICS & GYNECOLOGY

## 2022-07-28 PROCEDURE — 63600175 PHARM REV CODE 636 W HCPCS: Performed by: OBSTETRICS & GYNECOLOGY

## 2022-07-28 PROCEDURE — 85025 COMPLETE CBC W/AUTO DIFF WBC: CPT | Performed by: OBSTETRICS & GYNECOLOGY

## 2022-07-28 RX ORDER — PHENAZOPYRIDINE HYDROCHLORIDE 100 MG/1
200 TABLET, FILM COATED ORAL
Status: DISCONTINUED | OUTPATIENT
Start: 2022-07-28 | End: 2022-07-30 | Stop reason: HOSPADM

## 2022-07-28 RX ORDER — NITROFURANTOIN 25; 75 MG/1; MG/1
100 CAPSULE ORAL EVERY 12 HOURS
Status: DISCONTINUED | OUTPATIENT
Start: 2022-07-28 | End: 2022-07-30 | Stop reason: HOSPADM

## 2022-07-28 RX ADMIN — PRENATAL VITAMINS-IRON FUMARATE 27 MG IRON-FOLIC ACID 0.8 MG TABLET 1 TABLET: at 08:07

## 2022-07-28 RX ADMIN — OXYCODONE AND ACETAMINOPHEN 1 TABLET: 10; 325 TABLET ORAL at 11:07

## 2022-07-28 RX ADMIN — IBUPROFEN 600 MG: 600 TABLET ORAL at 05:07

## 2022-07-28 RX ADMIN — PHENAZOPYRIDINE HYDROCHLORIDE 200 MG: 100 TABLET ORAL at 09:07

## 2022-07-28 RX ADMIN — HYDROMORPHONE HYDROCHLORIDE 1 MG: 2 INJECTION INTRAMUSCULAR; INTRAVENOUS; SUBCUTANEOUS at 03:07

## 2022-07-28 RX ADMIN — DOCUSATE SODIUM 200 MG: 100 CAPSULE, LIQUID FILLED ORAL at 08:07

## 2022-07-28 RX ADMIN — HYDROMORPHONE HYDROCHLORIDE 1 MG: 2 INJECTION INTRAMUSCULAR; INTRAVENOUS; SUBCUTANEOUS at 07:07

## 2022-07-28 RX ADMIN — SIMETHICONE 80 MG: 80 TABLET, CHEWABLE ORAL at 10:07

## 2022-07-28 RX ADMIN — IBUPROFEN 600 MG: 600 TABLET ORAL at 07:07

## 2022-07-28 RX ADMIN — OXYCODONE AND ACETAMINOPHEN 1 TABLET: 10; 325 TABLET ORAL at 05:07

## 2022-07-28 RX ADMIN — OXYCODONE AND ACETAMINOPHEN 1 TABLET: 10; 325 TABLET ORAL at 09:07

## 2022-07-28 RX ADMIN — LEVETIRACETAM 1000 MG: 500 TABLET, FILM COATED ORAL at 09:07

## 2022-07-28 RX ADMIN — LEVETIRACETAM 1000 MG: 500 TABLET, FILM COATED ORAL at 08:07

## 2022-07-28 RX ADMIN — NITROFURANTOIN (MONOHYDRATE/MACROCRYSTALS) 100 MG: 75; 25 CAPSULE ORAL at 10:07

## 2022-07-28 NOTE — OP NOTE
[facility]                       [address] [id number]    PATIENT NAME:      DANIELLE BRINK  YOB: 1990  CSN:               [id number]  MRN:               [id number]  ADMIT DATE:        2022 10:51:00  PHYSICIAN:         Andrew Merrill MD                          OPERATIVE REPORT      DATE OF SURGERY:        SURGEON:  Andrew Merrill MD    PREOPERATIVE DIAGNOSIS:  A 32-year-old  5, para 3-0-1-3 at 36 weeks 6   days with chronic hypertension, superimposed preeclampsia with cephalopelvic   disproportion, no cervical change 4 hours with adequate contractions   intrauterine pressure catheter.    POSTOPERATIVE DIAGNOSIS:  A 32-year-old  5, para 3-0-1-3 at 36 weeks 6   days with chronic hypertension, superimposed preeclampsia with cephalopelvic   disproportion, no cervical change 4 hours with adequate contractions   intrauterine pressure catheter.    OPERATION:  Primary low transverse  section.    FIRST ASSISTANT:  Dr. Rodrigo Merrill Jr.    ANESTHESIOLOGIST:  Dr. Robert Garsia.    ANESTHESIA:  Epidural.    BLOOD LOSS:  Average, 450 cc.    FINDINGS:  Viable female infant, 7 pounds 15 ounces.  Occipitoposterior   presentation.  Normal uterus.  Normal adnexa bilaterally.  Freely mobile bowel   contents.  Smooth peritoneal surfaces.    DRAINS:  Crenshaw catheter 200 cc clear urine.    ANTIBIOTICS:  Ancef 3 g was given for body habitus.    DESCRIPTION OF PROCEDURE:  The risks, benefits, and alternatives to this   procedure were explained in detail to Ms Brink.  She verbalized understanding.    Consents were signed.  She was taken to the operating theater with intravenous   fluids running and placed on the table in the dorsal supine position where   epidural anesthesia was found to be adequate.  She was then prepped and draped   in the usual sterile fashion.      A Pfannenstiel skin  incision was made 2 fingerbreadths above the symphysis   pubis.  Carried down to the underlying layer of fascia.  The fascia was scored   in the midline.  Fascial incision was extended bilateral sharply.  The superior   aspect of the fascial incision grasped with [facility] clamps x2 and the underlying   rectus muscle dissected off sharply.  Inferior aspect of the fascial incision   was grasped with [facility] clamps x2 and the underlying rectus muscle dissected   off sharply.  Rectus  in the midline.  Parietal peritoneum identified,   tented up.  Was entered sharply, extended superiorly and inferiorly.  Bunny   self-retaining retractor placed without difficulty.  Vesicouterine peritoneum   identified, tented up, entered sharply, extended bilaterally.  Bladder flap   created digitally.  Transverse uterine incision made, carried down to the   underlying uterine cavity, extended bilaterally with manual dissection.    Infant's head was delivered.  Nose and mouth were suctioned.  The rest of baby   delivered without difficulty.  Cord doubly clamped and cut.  Baby handed off to   awaiting NICU team.      Cord blood collected.  Placenta removed manually.  Uterus was left in situ   secondary to body habitus, cleared of all clots and debris.  Uterine edge   grasped with Ballesteros clamps and reapproximated with #1 chromic suture in a   running locked fashion.  Hemostasis noted.  Uterine incision was irrigated and   cleared of all clots and debris.  Gutters cleared of all clots and debris.    Interceed adhesion barrier placed over lower uterine segment.  Bunny   self-retaining retractor removed.  Parietal peritoneum grasped with Monica clamps   and reapproximated with 3-0 Vicryl suture.  Rectus muscle reapproximated with   2-0 chromic suture.  Fascia reapproximated with #1 Vicryl suture in a running   fashion.  Hemostasis noted.  Subcu irrigated, reapproximated with 3-0 plain gut   suture.  Skin reapproximated with the  Insorb subcuticular staple device followed   by Dermabond glue.  Sponge, lap, instrument, and needle counts correct x2.      Patient was cleaned and brought to the recovery room awake and in stable   condition.  She will be started on magnesium sulfate again for 24 hours   postoperative postpartum.        ______________________________  MD KALLI Abrams/RICK  DD:  07/27/2022  Time:  09:17PM  DT:  07/27/2022  Time:  09:30PM  Job #:  [id number]      OPERATIVE REPORT

## 2022-07-28 NOTE — PROGRESS NOTES
[facility]                       [address] [id number]    PATIENT NAME:       DANIELLE BRINK  YOB: 1990  CSN:                [id number]   MRN:                [id number]  ADMIT DATE:         2022 10:51:00  PHYSICIAN:          Andrew Merrill MD                            PROGRESS NOTE    DATE:      Ms. Brink is a 32-year-old,  5, para 3-0-1-3, with an intrauterine   pregnancy at 36 weeks and 6 days.  She was just examined at the bedside.  She   has made no cervical change over the last 3 hours with adequate contractions on   IUPC before it fell out from maternal repositioning.  I explained to Ms. Brink   that at this point my recommendation is a  section.  The patient is in   agreement and wants to proceed.  We have notified the operative team.     I also explained to Ms. Brink that I have a patient who is an emergent    before her.  Her baby has a category 1 tracing at this time, her blood pressure   is stable, she is hemodynamically stable, and I will re-examine her 1 more time   before pursuing the section.  I explained to her that I will probably be about   another hour and a half because of logistics at the hospital and will   re-examine.  If she has still made no change, we will proceed.  The patient   verbalized understanding with this plan.  Time was taken for questions and   answers of her and her family.  They all understand.        ______________________________  Andrew Merrill MD    EPE/AQS  DD:  2022  Time:  06:52PM  DT:  2022  Time:  07:00PM  Job #:  [id number]      PROGRESS NOTE

## 2022-07-28 NOTE — PROGRESS NOTES
[facility]                       [address] [id number]    PATIENT NAME:       DANIELLE BRINK  YOB: 1990  CSN:                [id number]   MRN:                [id number]  ADMIT DATE:         2022 10:51:00  PHYSICIAN:          Andrew Merrill MD                            PROGRESS NOTE    DATE:      Ms. Brink was seen at the bedside this afternoon on the mother baby unit.  She   is currently doing well.  Breast-feeding her child.  She states her headache is   gone.  She denies scotomata.  She denies chest pain, shortness of breath,   nausea, vomiting, fevers and chills.  Her vital signs are good.  Her blood   pressure is in the 120s to 130s over 80s.  She is alert, oriented x3.  No   apparent distress.  Her abdomen is appropriate, tenderness postop day 1.    Extremities nontender.  Postoperative H and H are 10.4, 32.    ASSESSMENT AND PLAN:  32-year-old status post primary  section for   cephalopelvic disproportion with chronic hypertension, superimposed   preeclampsia, status post Mag sulfate.  The plan at this time is encourage   ambulation.  Continue with current care plan.        ______________________________  Andrew Merrill MD    EPE/AQS  DD:  2022  Time:  01:34PM  DT:  2022  Time:  01:44PM  Job #:  [id number]      PROGRESS NOTE

## 2022-07-28 NOTE — ANESTHESIA POSTPROCEDURE EVALUATION
Anesthesia Post Evaluation    Patient: Hill Brink    Procedure(s) Performed: Procedure(s) (LRB):   SECTION (N/A)          Patient location during evaluation: PACU  Post-procedure mental status: @ basline.  Post-procedure vital signs: reviewed and stable  Pain management: adequate      Anesthetic complications: no      Cardiovascular status: blood pressure returned to baseline  Respiratory status: @ baseline.  Hydration status: euvolemic            Vitals Value Taken Time   /83 22   Temp 36.9 °C (98.4 °F) 22   Pulse 86 22   Resp 12 22   SpO2 100 % 22         No case tracking events are documented in the log.      Pain/Cesario Score: Pain Rating Prior to Med Admin: 4 (2022  5:24 AM)    No complaints at this time.  Subsequent postop follow up will be done at at a later time by another member of the anesthesia team.

## 2022-07-28 NOTE — TRANSFER OF CARE
"Anesthesia Transfer of Care Note    Patient: Hill Brink    Procedure(s) Performed: Procedure(s) (LRB):   SECTION (N/A)    Patient location: Labor and Delivery    Anesthesia Type: epidural    Transport from OR: Transported from OR on room air with adequate spontaneous ventilation    Post pain: adequate analgesia    Post assessment: no apparent anesthetic complications    Post vital signs: stable    Level of consciousness: awake, alert and oriented    Nausea/Vomiting: no nausea/vomiting    Complications: none    Transfer of care protocol was followed      Last vitals:   Visit Vitals  /83 (BP Location: Right arm, Patient Position: Lying)   Pulse 86   Temp 36.9 °C (98.4 °F) (Oral)   Resp 12   Ht 5' 5" (1.651 m)   Wt (!) 141.5 kg (312 lb)   SpO2 100%   Breastfeeding No   BMI 51.92 kg/m²     "

## 2022-07-28 NOTE — PROGRESS NOTES
[facility]                       [address] [id number]    PATIENT NAME:       DANIELLE BRINK  YOB: 1990  CSN:                [id number]   MRN:                [id number]  ADMIT DATE:         2022 10:51:00  PHYSICIAN:          Andrew Merrill MD                            PROGRESS NOTE    DATE:      I just re-examined Ms. Brink.  She has made no cervical change over a period of   4 hours.  We will proceed to  section.  She has been counseled on the   risks, benefits, and alternatives to this procedure.  Time was taken again for   questions and answers with her and her . They verbalized understanding   and we are proceeding.        ______________________________  Andrew Merrill MD    EPE/AQS  DD:  2022  Time:  07:57PM  DT:  2022  Time:  08:03PM  Job #:  [id number]      PROGRESS NOTE

## 2022-07-29 LAB
RUBV IGG SERPL IA-ACNC: 0.5
RUBV IGG SERPL QL IA: NEGATIVE

## 2022-07-29 PROCEDURE — 11000001 HC ACUTE MED/SURG PRIVATE ROOM

## 2022-07-29 PROCEDURE — 25000003 PHARM REV CODE 250: Performed by: OBSTETRICS & GYNECOLOGY

## 2022-07-29 PROCEDURE — 25000003 PHARM REV CODE 250: Performed by: STUDENT IN AN ORGANIZED HEALTH CARE EDUCATION/TRAINING PROGRAM

## 2022-07-29 RX ADMIN — IBUPROFEN 600 MG: 600 TABLET ORAL at 04:07

## 2022-07-29 RX ADMIN — IBUPROFEN 600 MG: 600 TABLET ORAL at 10:07

## 2022-07-29 RX ADMIN — LEVETIRACETAM 500 MG: 500 TABLET, FILM COATED ORAL at 08:07

## 2022-07-29 RX ADMIN — PHENAZOPYRIDINE HYDROCHLORIDE 200 MG: 100 TABLET ORAL at 12:07

## 2022-07-29 RX ADMIN — NITROFURANTOIN (MONOHYDRATE/MACROCRYSTALS) 100 MG: 75; 25 CAPSULE ORAL at 11:07

## 2022-07-29 RX ADMIN — NITROFURANTOIN (MONOHYDRATE/MACROCRYSTALS) 100 MG: 75; 25 CAPSULE ORAL at 10:07

## 2022-07-29 RX ADMIN — OXYCODONE AND ACETAMINOPHEN 1 TABLET: 10; 325 TABLET ORAL at 12:07

## 2022-07-29 RX ADMIN — PHENAZOPYRIDINE HYDROCHLORIDE 200 MG: 100 TABLET ORAL at 05:07

## 2022-07-29 RX ADMIN — DOCUSATE SODIUM 200 MG: 100 CAPSULE, LIQUID FILLED ORAL at 08:07

## 2022-07-29 RX ADMIN — LEVETIRACETAM 1000 MG: 500 TABLET, FILM COATED ORAL at 09:07

## 2022-07-29 RX ADMIN — DOCUSATE SODIUM 200 MG: 100 CAPSULE, LIQUID FILLED ORAL at 09:07

## 2022-07-29 RX ADMIN — PRENATAL VITAMINS-IRON FUMARATE 27 MG IRON-FOLIC ACID 0.8 MG TABLET 1 TABLET: at 09:07

## 2022-07-29 RX ADMIN — IBUPROFEN 600 MG: 600 TABLET ORAL at 09:07

## 2022-07-29 RX ADMIN — OXYCODONE AND ACETAMINOPHEN 1 TABLET: 10; 325 TABLET ORAL at 08:07

## 2022-07-29 RX ADMIN — OXYCODONE AND ACETAMINOPHEN 1 TABLET: 10; 325 TABLET ORAL at 02:07

## 2022-07-29 RX ADMIN — PHENAZOPYRIDINE HYDROCHLORIDE 200 MG: 100 TABLET ORAL at 08:07

## 2022-07-29 NOTE — PROGRESS NOTES
PostPartum Progress Note        Subjective:      Postpartum Day #2 after LTCS  .  C/o dysuria and other bladder pain.  Has blood associated with urine, but on questioning, pt believes it is vaginal and not urethral blood.  sxs are improved after abx started.     Lochia decreasing, less than menses.  Pain is well controlled. Patient is ambulating. Passing flatus.  Tolerating regular diet .      Objective:      Temp:  [98.4 °F (36.9 °C)-99 °F (37.2 °C)] 99 °F (37.2 °C)  Pulse:  [] 112  Resp:  [14-18] 18  BP: (109-133)/(71-84) 114/74    Intake/Output Summary (Last 24 hours) at 2022 0717  Last data filed at 2022 1100  Gross per 24 hour   Intake 300 ml   Output 900 ml   Net -600 ml     Body mass index is 51.92 kg/m².    General: no acute distress  Abdomen: soft, non-tender, midlly distended with hypoactive BS.  LTCS incision healing well   Extremities: non-tender, symmetric,    Group & Rh   Date Value Ref Range Status   2022 O POS  Final     Recent Results (from the past 336 hour(s))   CBC with Differential    Collection Time: 22  7:27 AM   Result Value Ref Range    WBC 10.0 4.5 - 11.5 x10(3)/mcL    Hgb 10.4 (L) 12.0 - 16.0 gm/dL    Hct 32.0 (L) 37.0 - 47.0 %    Platelet 134 130 - 400 x10(3)/mcL   CBC with Differential    Collection Time: 22 11:36 AM   Result Value Ref Range    WBC 8.1 4.5 - 11.5 x10(3)/mcL    Hgb 12.4 12.0 - 16.0 gm/dL    Hct 38.9 37.0 - 47.0 %    Platelet 146 130 - 400 x10(3)/mcL   CBC with Differential    Collection Time: 22 10:14 AM   Result Value Ref Range    WBC 9.3 4.5 - 11.5 x10(3)/mcL    Hgb 13.0 12.0 - 16.0 gm/dL    Hct 41.1 37.0 - 47.0 %    Platelet 157 130 - 400 x10(3)/mcL          Assessment:     32 y.o.  S/P , Post-Partum (post-op) Day # 2 - Doing Well   UTI     Plan:     Continue routine postpartum care  Continue macrobid and pyridium for bladder pain awaiting UCx  Anticipated d/c POD#3-4    Raymond Brice MD  2022 7:19 AM       GCS 15 pt stated that around 0630 a few dogs knocked her into a metal gate and pt hit her face; denies LOC but memory of what occurred is a bit fuzzy; pt unable to fully recall what occurred; c/o HA, n/v, or visual changes

## 2022-07-29 NOTE — LACTATION NOTE
This note was copied from a baby's chart.  Mom nursing baby, good latch noted but baby is sleepy. Baby just started on phototherapy today. mOm is supplementing with formula. Encouraged pumping after nursing. Explained how to do triple feeds. Dad assisted with paced bottle feeding. Mom assisted with pumping and reivewed cleaning kit after each use and milk storage guidelines.     Answered moms questions. Offered further assistance if needed. Mom working on getting a pump for home use. Told mom about pump rentals available in gift shop. Verbalized understanding.

## 2022-07-30 VITALS
RESPIRATION RATE: 12 BRPM | TEMPERATURE: 99 F | SYSTOLIC BLOOD PRESSURE: 120 MMHG | WEIGHT: 293 LBS | OXYGEN SATURATION: 97 % | DIASTOLIC BLOOD PRESSURE: 80 MMHG | BODY MASS INDEX: 48.82 KG/M2 | HEIGHT: 65 IN | HEART RATE: 111 BPM

## 2022-07-30 PROBLEM — Z98.890 POST-OPERATIVE STATE: Status: ACTIVE | Noted: 2022-07-30

## 2022-07-30 LAB — BACTERIA UR CULT: NO GROWTH

## 2022-07-30 PROCEDURE — 25000003 PHARM REV CODE 250: Performed by: STUDENT IN AN ORGANIZED HEALTH CARE EDUCATION/TRAINING PROGRAM

## 2022-07-30 PROCEDURE — 25000003 PHARM REV CODE 250: Performed by: OBSTETRICS & GYNECOLOGY

## 2022-07-30 RX ADMIN — IBUPROFEN 600 MG: 600 TABLET ORAL at 08:07

## 2022-07-30 RX ADMIN — NITROFURANTOIN (MONOHYDRATE/MACROCRYSTALS) 100 MG: 75; 25 CAPSULE ORAL at 09:07

## 2022-07-30 RX ADMIN — DOCUSATE SODIUM 200 MG: 100 CAPSULE, LIQUID FILLED ORAL at 08:07

## 2022-07-30 RX ADMIN — OXYCODONE AND ACETAMINOPHEN 1 TABLET: 10; 325 TABLET ORAL at 12:07

## 2022-07-30 RX ADMIN — IBUPROFEN 600 MG: 600 TABLET ORAL at 04:07

## 2022-07-30 RX ADMIN — PHENAZOPYRIDINE HYDROCHLORIDE 200 MG: 100 TABLET ORAL at 08:07

## 2022-07-30 RX ADMIN — OXYCODONE AND ACETAMINOPHEN 1 TABLET: 10; 325 TABLET ORAL at 06:07

## 2022-07-30 RX ADMIN — PRENATAL VITAMINS-IRON FUMARATE 27 MG IRON-FOLIC ACID 0.8 MG TABLET 1 TABLET: at 08:07

## 2022-07-30 RX ADMIN — LEVETIRACETAM 1000 MG: 500 TABLET, FILM COATED ORAL at 08:07

## 2022-07-30 NOTE — PLAN OF CARE
Problem: Adult Inpatient Plan of Care  Goal: Plan of Care Review  Outcome: Ongoing, Progressing  Goal: Absence of Hospital-Acquired Illness or Injury  Outcome: Ongoing, Progressing  Goal: Optimal Comfort and Wellbeing  Outcome: Ongoing, Progressing  Goal: Readiness for Transition of Care  Outcome: Ongoing, Progressing     Problem: Bariatric Environmental Safety  Goal: Safety Maintained with Care  Outcome: Ongoing, Progressing     Problem:  Fall Injury Risk  Goal: Absence of Fall, Infant Drop and Related Injury  Outcome: Ongoing, Progressing     Problem: Infection  Goal: Absence of Infection Signs and Symptoms  Outcome: Ongoing, Progressing

## 2022-07-30 NOTE — LACTATION NOTE
"This note was copied from a baby's chart.  Mother reports infant Bf x15" and also fed 1.5 oz of EBM that was collected with pumping.  Reviewed discharge Bf education, OP resources discussed. Encouraged to feed with early hunger cues or 2-3 hours, monitor output.   "

## 2022-07-30 NOTE — PLAN OF CARE
Problem: Breastfeeding  Goal: Effective Breastfeeding  Outcome: Ongoing, Progressing  Intervention: Promote Breast Care and Comfort  Flowsheets (Taken 7/30/2022 1403)  Breast Care: Breastfeeding: frequency of feeding adjusted  Breast Pumping:   double electric breast pump utilized   pre-pumping breast massage   pre-pumping tactile stimulation  Intervention: Promote Effective Breastfeeding  Flowsheets (Taken 7/30/2022 1403)  Breastfeeding Assistance: support offered  Intervention: Support Exclusive Breastfeeding Success  Flowsheets (Taken 7/30/2022 1403)  Supportive Measures: active listening utilized  Breastfeeding Support:   lactation counseling provided   encouragement provided

## 2022-07-30 NOTE — PROGRESS NOTES
PostPartum Progress Note        Subjective:      Postpartum Day #3 after LTCS  .  C/o dysuria and other bladder pain. sxs are improved after abx started.  Still concerned about blood from urethra but declines straight cath.      Lochia decreasing, less than menses.  Pain is well controlled. Patient is ambulating. Passing flatus and had BM.  Tolerating regular diet .    Very anxious and frustrated about her baby, bili lights.  No h/o anx/dep and this is all situational.        Objective:      Temp:  [98 °F (36.7 °C)-98.8 °F (37.1 °C)] 98.8 °F (37.1 °C)  Pulse:  [] 111  Resp:  [12-19] 18  BP: (121-152)/(82-88) 126/84  No intake or output data in the 24 hours ending 22 0917  Body mass index is 51.92 kg/m².    General: no acute distress, tearful due to frustration, standing and clothed (not in gown)   Well perfused  Nonlabored breathing      Group & Rh   Date Value Ref Range Status   2022 O POS  Final     Recent Results (from the past 336 hour(s))   CBC with Differential    Collection Time: 22  7:27 AM   Result Value Ref Range    WBC 10.0 4.5 - 11.5 x10(3)/mcL    Hgb 10.4 (L) 12.0 - 16.0 gm/dL    Hct 32.0 (L) 37.0 - 47.0 %    Platelet 134 130 - 400 x10(3)/mcL   CBC with Differential    Collection Time: 22 11:36 AM   Result Value Ref Range    WBC 8.1 4.5 - 11.5 x10(3)/mcL    Hgb 12.4 12.0 - 16.0 gm/dL    Hct 38.9 37.0 - 47.0 %    Platelet 146 130 - 400 x10(3)/mcL   CBC with Differential    Collection Time: 22 10:14 AM   Result Value Ref Range    WBC 9.3 4.5 - 11.5 x10(3)/mcL    Hgb 13.0 12.0 - 16.0 gm/dL    Hct 41.1 37.0 - 47.0 %    Platelet 157 130 - 400 x10(3)/mcL          Assessment:     32 y.o.  S/P , Post-Partum (post-op) Day # 3    -Continue routine postpartum care    UTI vs bladder pain   -discussed with pt the NG @ 24h of UCx, pt would like to continue macrobid and pyridium for bladder pain awaiting final UCx     Stress from Infant's bili rising   -asked staff  to review bili information and tx plan    Anticipated d/c POD#4    Raymond Brice MD  07/30/2022 7:19 AM

## 2022-07-30 NOTE — LACTATION NOTE
"Mother reports Bf x5 in last 24h for 20-50".  Infant also formula fed x4.  Infant at 8% wt  loss, adequate output, high risk bili; remains under phototherapy.  Mother stated she is leaking milk today, breasts are heavier.  Encouraged to pump if bottle feeding to provide EBM and establish milk supply while under phototherapy. Pump at bedside, call for assistance as needed.  "

## 2022-07-30 NOTE — DISCHARGE INSTRUCTIONS
"[facility]        [phone]  Discharge Instructions    Watch for early feeding cues (rooting, hand to mouth, smacking lips, sticking out tongue). Offer the breast at the first signs of hunger. Crying is a late sign of hunger; don't wait until then.    Feed your baby at least 8-12 times in a 24-hour period. Feeding early and often will ensure a plentiful milk supply for you and your baby and will prevent engorgement in the coming days.  Do not limit or schedule feedings.    "Cluster feeding" is normal; baby may nurse very often for several times in a row. This commonly occurs in the evening or early part of the night.    Allow your baby to finish one side before offering the other. You can try to burp the baby and then offer the other breast if he/ she seems to still be hungry.     Skin to skin contact helps a sleepy baby want to nurse. Babies who are frequently held skin to skin nurse better and longer. Skin to skin increases mom's milk-making hormone levels as well. Skin to skin can help calm baby too.     By the end of the first week, you want to see 6-8 wet diapers per day and 3-5 yellow, seedy stools (stools will change from black to green to yellow by the end of the 1st week. Refer to chart in breastfeeding booklet to see how many wet/ dirty diapers baby should be having each day. Notify pediatrician if baby is not having enough wet and dirty diapers.    It is best to avoid bottles and pacifiers for the first 4 weeks while getting breastfeeding established.     Back to work or school: 4 weeks is a good time to start pumping after morning feeds in order to store milk for baby, although you may pump before if needed. Around 4-6 weeks is a good time to introduce a bottle of pumped milk to baby if you will go back to work or school.     You should feel a tugging or pulling sensation when your baby nurses; it should never feel sharp, pinching, or singing. If there is pain, try to adjust the latch. Make " sure your baby opens his mouth wide to latch on. His lips should be flanged out, like a fish. (You may want to refer to the handouts in your packet or view latch videos at [employer] or [employer].    Listen for swallowing. This indicates your baby is transferring that milk!     Your milk will increase between days 3-5. Frequent feeds can help with engorgement.     If your breasts begin to get engorged, place warm cloths on them or  a warm shower before feeding. This will help the milk begin to flow. Feed often to drain the breasts. After feeding, you may use cold packs for 10-15 minutes to reduce swelling. You may also want to pump for comfort; don't overdo it- just pump enough to relieve the fullness.     No soap or lotions to the nipples except for medical grade lanolin or nipple cream for soreness.     All babies go through growth spurts. The first one is generally around 2-3 weeks. If your baby starts to nurse a lot more than usual, this is likely the reason. Growth spurts happen every so often and usually last for 3-5 days.     Remember to check the safety of any medications, prescription or non-prescription (including herbals), before you take them. Your baby's pediatrician is the best one to confirm the safety of the medication while you are breastfeeding. You may also phone us. We can tell you about safety ratings that have been published regarding a particular medication. You may wish to phone the [facility] at [phone] to check the safety of a medication.     Call with any questions or concerns. Don't wait-- ask for help early. Breastfeeding Resources can be found on the last few pages of your Breastfeeding Booklet given to you in the hospital.

## 2022-08-02 ENCOUNTER — PATIENT OUTREACH (OUTPATIENT)
Dept: ADMINISTRATIVE | Facility: CLINIC | Age: 32
End: 2022-08-02
Payer: MEDICAID

## 2022-08-02 NOTE — PROGRESS NOTES
C3 nurse spoke with Hill Brink for a TCC post hospital discharge follow up call. The patient has a scheduled HOSFU appointment with Dr. Andrew Merrill (OBGYN) on 8/9/2022.    Patient also taking Percocet.

## 2022-08-05 ENCOUNTER — DOCUMENTATION ONLY (OUTPATIENT)
Dept: REHABILITATION | Facility: HOSPITAL | Age: 32
End: 2022-08-05
Payer: MEDICAID

## 2022-08-05 NOTE — PROGRESS NOTES
[facility]  Physical Therapy Discharge Note    Name: Hill Brink  Clinic Number: [id number]    L side sciatic M54.32  B Hip pain M25.559    Date of Last visit: 07/20/22  Total Visits Received: Eval + 3 visits    Assessment      Hill Brink did not return to physical therapy today for final assessment an discharge, Patient was pregnant and delivered. No formal return.   [facility] goals were address as listed below and met as stated.  Hill was issued a home exercise program with handouts throughout this episode of care to reference for continued wellness and physical fitness . Contact information was given at evaluation in case any questions arise in the future or if therapy is needed.    Discharge reason: patient did not return for formal physical therapy due to delivery of baby     Short Term Goals (3 Weeks):   1. Pt will be compliant with HEP to supplement PT in decreasing pain with functional mobility.- MET  2. Pt will perform and hold L hip muscle energy  contraction for 3 x 10 seconds  In supine  to demonstrate improved core strength- progressing   3. Pt will improve impaired LE MMTs to >/=3/5 to improve strength for functional tasks.- progressing   Long Term Goals (6 Weeks):   1. Pt will improve LE Functional Scale score to </= 60% limited to decrease perceived limitation with maintaining/changing body position  2. Pt will improve Left piriformis flexibility as seen by being able to cross left LE over right knee in seated position  3. Pt will improve impaired LE MMTs to >/=4/5 to improve strength for functional tasks.    Plan   This patient is discharged from Physical Therapy.

## 2022-08-21 NOTE — DISCHARGE SUMMARY
[facility] - 2nd Floor Mother/Baby Unit  Obstetrics  Discharge Summary      Patient Name: Hill Brink  MRN: [id number]  Admission Date: 2022  Hospital Length of Stay: 4 days  Discharge Date and Time:  2022 4:06 PM  Attending Physician: No att. providers found   Discharging Provider: Andrew Merrill MD   Primary Care Provider: Jamison Davila DO    HPI: No notes on file        Procedure(s) (LRB):   SECTION (N/A)     Hospital Course:   No notes on file         Final Active Diagnoses:    Diagnosis Date Noted POA    PRINCIPAL PROBLEM:  Post-operative state [Z98.890] 2022 Not Applicable      Problems Resolved During this Admission:        Significant Diagnostic Studies: Labs: All labs within the past 24 hours have been reviewed      Feeding Method: both breast and bottle    Immunizations     None          Delivery:    Episiotomy:     Lacerations:     Repair suture:     Repair # of packets:     Blood loss (ml):       Birth information:  YOB: 2021   Time of birth: 8:44 PM   Sex: female   Delivery type: , Low Transverse   Gestational Age: 36w6d    Delivery Clinician:      Other providers:       Additional  information:  Forceps:    Vacuum:    Breech:    Observed anomalies      Living?:           APGARS  One minute Five minutes Ten minutes   Skin color:         Heart rate:         Grimace:         Muscle tone:         Breathing:         Totals: 9  9        Placenta: Delivered:       appearance    Pending Diagnostic Studies:     None          Discharged Condition: stable    Disposition: Home or Self Care    Follow Up:   Follow-up Information     Andrew Merrill MD. Schedule an appointment as soon as possible for a visit in 2 week(s).    Specialty: Obstetrics and Gynecology  Contact information:  [address] [facility] [address] [phone]                       Patient Instructions:      Diet Adult Regular     Ice to affected area     Lifting  restrictions   Order Comments: No more than the weight of your baby in the carseat     No driving until:   Order Comments: Off narcotics and comfortable slamming on the brakes     Pelvic Rest     Notify your health care provider if you experience any of the following:  temperature >100.4     Notify your health care provider if you experience any of the following:  persistent nausea and vomiting or diarrhea     Notify your health care provider if you experience any of the following:  severe uncontrolled pain     Notify your health care provider if you experience any of the following:  redness, tenderness, or signs of infection (pain, swelling, redness, odor or green/yellow discharge around incision site)     Notify your health care provider if you experience any of the following:  difficulty breathing or increased cough     Notify your health care provider if you experience any of the following:  severe persistent headache     Notify your health care provider if you experience any of the following:  worsening rash     Notify your health care provider if you experience any of the following:  persistent dizziness, light-headedness, or visual disturbances     Notify your health care provider if you experience any of the following:  increased confusion or weakness     Notify your health care provider if you experience any of the following:     Activity as tolerated     Medications:  Discharge Medication List as of 7/30/2022  6:33 PM      CONTINUE these medications which have NOT CHANGED    Details   FEROSUL 325 mg (65 mg iron) Tab tablet Take by mouth once daily., Starting Fri 4/15/2022, Historical Med      folic acid (FOLVITE) 1 MG tablet Take 1,000 mcg by mouth once daily., Starting Thu 4/14/2022, Historical Med      levETIRAcetam (KEPPRA) 1000 MG tablet Take 1,000 mg by mouth 2 (two) times daily., Starting Sun 5/1/2022, Historical Med      prenatal vit-iron fum-folic ac 28 mg iron- 800 mcg Tab Take by mouth., Starting  Mon 12/27/2021, Historical Med         STOP taking these medications       busPIRone (BUSPAR) 10 MG tablet Comments:   Reason for Stopping:         loratadine (CLARITIN) 10 mg tablet Comments:   Reason for Stopping:               Andrew Merrill MD  Obstetrics  [facility] - 2nd Floor Mother/Baby Unit

## 2022-12-29 ENCOUNTER — HOSPITAL ENCOUNTER (EMERGENCY)
Facility: HOSPITAL | Age: 32
Discharge: HOME | End: 2022-12-29
Attending: INTERNAL MEDICINE
Payer: MEDICAID

## 2022-12-29 VITALS
RESPIRATION RATE: 18 BRPM | WEIGHT: 290 LBS | TEMPERATURE: 98 F | DIASTOLIC BLOOD PRESSURE: 91 MMHG | OXYGEN SATURATION: 99 % | HEIGHT: 65 IN | BODY MASS INDEX: 48.32 KG/M2 | SYSTOLIC BLOOD PRESSURE: 137 MMHG | HEART RATE: 94 BPM

## 2022-12-29 DIAGNOSIS — R10.2 FEMALE PELVIC PAIN: Primary | ICD-10-CM

## 2022-12-29 DIAGNOSIS — N94.6 DYSMENORRHEA: ICD-10-CM

## 2022-12-29 LAB
ALBUMIN SERPL-MCNC: 3.8 G/DL (ref 3.5–5)
ALBUMIN/GLOB SERPL: 1.2 RATIO (ref 1.1–2)
ALP SERPL-CCNC: 121 UNIT/L (ref 40–150)
ALT SERPL-CCNC: 13 UNIT/L (ref 0–55)
APPEARANCE UR: CLEAR
AST SERPL-CCNC: 9 UNIT/L (ref 5–34)
B-HCG SERPL QL: NEGATIVE
BASOPHILS # BLD AUTO: 0.04 X10(3)/MCL (ref 0–0.2)
BASOPHILS NFR BLD AUTO: 0.5 %
BILIRUB UR QL STRIP.AUTO: NEGATIVE MG/DL
BILIRUBIN DIRECT+TOT PNL SERPL-MCNC: 0.3 MG/DL
BUN SERPL-MCNC: 11 MG/DL (ref 7–18.7)
CALCIUM SERPL-MCNC: 9.5 MG/DL (ref 8.4–10.2)
CHLORIDE SERPL-SCNC: 102 MMOL/L (ref 98–107)
CO2 SERPL-SCNC: 28 MMOL/L (ref 22–29)
COLOR UR AUTO: NORMAL
CREAT SERPL-MCNC: 0.62 MG/DL (ref 0.55–1.02)
EOSINOPHIL # BLD AUTO: 0.16 X10(3)/MCL (ref 0–0.9)
EOSINOPHIL NFR BLD AUTO: 2.1 %
ERYTHROCYTE [DISTWIDTH] IN BLOOD BY AUTOMATED COUNT: 13.9 % (ref 11–14.5)
GFR SERPLBLD CREATININE-BSD FMLA CKD-EPI: >60 MLS/MIN/1.73/M2
GLOBULIN SER-MCNC: 3.2 GM/DL (ref 2.4–3.5)
GLUCOSE SERPL-MCNC: 97 MG/DL (ref 74–100)
GLUCOSE UR QL STRIP.AUTO: NEGATIVE MG/DL
HCT VFR BLD AUTO: 36.3 % (ref 37–47)
HGB BLD-MCNC: 11.6 GM/DL (ref 12–16)
IMM GRANULOCYTES # BLD AUTO: 0.02 X10(3)/MCL (ref 0–0.04)
IMM GRANULOCYTES NFR BLD AUTO: 0.3 %
KETONES UR QL STRIP.AUTO: NEGATIVE MG/DL
LEUKOCYTE ESTERASE UR QL STRIP.AUTO: NEGATIVE UNIT/L
LYMPHOCYTES # BLD AUTO: 2.69 X10(3)/MCL (ref 0.6–4.6)
LYMPHOCYTES NFR BLD AUTO: 35.7 %
MCH RBC QN AUTO: 26.6 PG
MCHC RBC AUTO-ENTMCNC: 32 MG/DL (ref 33–36)
MCV RBC AUTO: 83.3 FL (ref 80–94)
MONOCYTES # BLD AUTO: 0.59 X10(3)/MCL (ref 0.1–1.3)
MONOCYTES NFR BLD AUTO: 7.8 %
NEUTROPHILS # BLD AUTO: 4.03 X10(3)/MCL (ref 2.1–9.2)
NEUTROPHILS NFR BLD AUTO: 53.6 %
NITRITE UR QL STRIP.AUTO: NEGATIVE
NRBC BLD AUTO-RTO: 0 % (ref 0–1)
PH UR STRIP.AUTO: 6 [PH]
PLATELET # BLD AUTO: 203 X10(3)/MCL (ref 140–371)
PMV BLD AUTO: 9.8 FL (ref 9.4–12.4)
POTASSIUM SERPL-SCNC: 3.8 MMOL/L (ref 3.5–5.1)
PROT SERPL-MCNC: 7 GM/DL (ref 6.4–8.3)
PROT UR QL STRIP.AUTO: NEGATIVE MG/DL
RBC # BLD AUTO: 4.36 X10(6)/MCL (ref 4.2–5.4)
RBC UR QL AUTO: NEGATIVE UNIT/L
SODIUM SERPL-SCNC: 139 MMOL/L (ref 136–145)
SP GR UR STRIP.AUTO: 1.01
UROBILINOGEN UR STRIP-ACNC: 0.2 MG/DL
WBC # SPEC AUTO: 7.5 X10(3)/MCL (ref 4.5–11.5)

## 2022-12-29 PROCEDURE — 80053 COMPREHEN METABOLIC PANEL: CPT | Performed by: INTERNAL MEDICINE

## 2022-12-29 PROCEDURE — 81003 URINALYSIS AUTO W/O SCOPE: CPT | Performed by: INTERNAL MEDICINE

## 2022-12-29 PROCEDURE — 99285 EMERGENCY DEPT VISIT HI MDM: CPT | Mod: 25

## 2022-12-29 PROCEDURE — 63600175 PHARM REV CODE 636 W HCPCS: Performed by: INTERNAL MEDICINE

## 2022-12-29 PROCEDURE — 85025 COMPLETE CBC W/AUTO DIFF WBC: CPT | Performed by: INTERNAL MEDICINE

## 2022-12-29 PROCEDURE — 81025 URINE PREGNANCY TEST: CPT | Performed by: INTERNAL MEDICINE

## 2022-12-29 PROCEDURE — 96372 THER/PROPH/DIAG INJ SC/IM: CPT | Performed by: INTERNAL MEDICINE

## 2022-12-29 RX ORDER — KETOROLAC TROMETHAMINE 30 MG/ML
60 INJECTION, SOLUTION INTRAMUSCULAR; INTRAVENOUS
Status: COMPLETED | OUTPATIENT
Start: 2022-12-29 | End: 2022-12-29

## 2022-12-29 RX ADMIN — KETOROLAC TROMETHAMINE 60 MG: 30 INJECTION, SOLUTION INTRAMUSCULAR; INTRAVENOUS at 07:12

## 2022-12-29 NOTE — ED PROVIDER NOTES
Source of History:  Patient, no limitations    Chief complaint:  Abdominal Pain (PT IS 5 MONTHS POST PARTUM HAVING SPOTTING X 2 MONTHS, having really bad cramps and fatigue, abd pain, Denies Vomitting and Diarrhea, sees Dr. JEROME Merrill )      HPI:  Hill Brink is a 32 y.o. female presenting with Abdominal Pain (PT IS 5 MONTHS POST PARTUM HAVING SPOTTING X 2 MONTHS, having really bad cramps and fatigue, abd pain, Denies Vomitting and Diarrhea, sees Dr. JEROME Merrill )       32-year-old female who is 5 months postpartum for her 5th child that was complicated by preeclampsia and urgent  which was her 1st , presents to the ED with abnormal uterine cycle described as spotting the last 2 months and has not resumed normal cycles, does report that she is currently breastfeeding, also complains of generalized fatigue, called her OB for an appointment which they scheduled for next month     Patient presents for evaluation of abdominal pain. Onset of symptoms was gradual starting 5 months ago with stable course since that time. The pain is located LLQ, RLQ without radiation. The pain is rated as moderate. The pain is made worse by pressure and is relieved by nothing. The patient denies anorexia, constipation, diarrhea, and fever.         Review of Systems   Constitutional symptoms:  Negative except as documented in HPI.   Skin symptoms:  Negative except as documented in HPI.   HEENT symptoms:  Negative except as documented in HPI.   Respiratory symptoms:  Negative except as documented in HPI.   Cardiovascular symptoms:  Negative except as documented in HPI.   Gastrointestinal symptoms:  Negative except as documented in HPI.    Genitourinary symptoms:  Negative except as documented in HPI.   Musculoskeletal symptoms:  Negative except as documented in HPI.   Neurologic symptoms:  Negative except as documented in HPI.   Psychiatric symptoms:  Negative except as documented in HPI.   Allergy/immunologic symptoms:  " Negative except as documented in HPI.             Additional review of systems information: All other systems reviewed and otherwise negative.      Review of patient's allergies indicates:  No Known Allergies    PMH:  As per HPI and below:    Past Medical History:   Diagnosis Date    Seizures         Family History   Problem Relation Age of Onset    Hyperlipidemia Mother     Colon cancer Maternal Grandmother        Past Surgical History:   Procedure Laterality Date     SECTION N/A 2022    Procedure:  SECTION;  Surgeon: Andrew Merrill MD;  Location: [facility];  Service: OB/GYN;  Laterality: N/A;    CHOLECYSTECTOMY      hemrroid surgery         Social History     Tobacco Use    Smoking status: Former    Smokeless tobacco: Former   Substance Use Topics    Alcohol use: Not Currently    Drug use: Not Currently       Patient Active Problem List   Diagnosis    Sciatica of left side    Bilateral hip pain    Post-operative state        Physical Exam:    BP (!) 137/91   Pulse 94   Temp 97.7 °F (36.5 °C)   Resp 18   Ht 5' 5" (1.651 m)   Wt 131.5 kg (290 lb)   SpO2 99%   Breastfeeding Yes   BMI 48.26 kg/m²     Nursing note and vital signs reviewed.    General:  Alert, no acute distress.   Skin: Normal for Ethnic Origin, No cyanosis  HEENT: Normocephalic and atraumatic, Vision unchanged, Pupils symmetric, No icterus , Nasal mucosa is pink and moist  Cardiovascular:  Regular rate and rhythm, No edema  Chest Wall: No deformity, equal chest rise  Respiratory:  Lungs are clear to auscultation, respirations are non-labored.    Musculoskeletal:  No deformity, Normal perfusion to all extremities  Gastrointestinal:  Soft, Non distended, mild lower abdominal tenderness, no rebound  Neurological:  Alert and oriented, normal motor observed, normal speech observed.    Psychiatric:  Cooperative, appropriate mood & affect.        Labs that have been ordered have been independently reviewed and interpreted by " myself.     Old Chart Reviewed.      Initial Impression/ Differential Dx:  Vaginal infection such as yeast infection, vaginitis, topical irritant, bacterial vaginosis, STD such as gonorrhea, chlamydia, UTI, discomfort of pregnancy, ectopic pregnancy, ovarian cysts, ovarian torsion, malignancy, constipation, colitis, diverticulitis      MDM:      Reviewed Nurses Note.    Reviewed Pertinent old records.    Orders Placed This Encounter    CT Abdomen Pelvis  Without Contrast    Urinalysis, Reflex to Urine Culture Urine, Clean Catch    Pregnancy, urine rapid    CBC Auto Differential    Comprehensive Metabolic Panel    CBC with Differential    ketorolac injection 60 mg                    Labs Reviewed   CBC WITH DIFFERENTIAL - Abnormal; Notable for the following components:       Result Value    Hgb 11.6 (*)     Hct 36.3 (*)     MCHC 32.0 (*)     All other components within normal limits   PREGNANCY TEST, URINE RAPID - Normal   URINALYSIS, REFLEX TO URINE CULTURE   CBC W/ AUTO DIFFERENTIAL    Narrative:     The following orders were created for panel order CBC Auto Differential.  Procedure                               Abnormality         Status                     ---------                               -----------         ------                     CBC with Differential[[id number]]        Abnormal            Final result                 Please view results for these tests on the individual orders.   COMPREHENSIVE METABOLIC PANEL          CT Abdomen Pelvis  Without Contrast   Final Result      No acute abdominopelvic findings on this noncontrast scan.         Electronically signed by: Marlon John   Date:    12/29/2022   Time:    18:56           Admission on 12/29/2022, Discharged on 12/29/2022   Component Date Value Ref Range Status    Color, UA 12/29/2022 Straw  Yellow, Light-Yellow, Dark Yellow, Mimi, Straw Final    Appearance, UA 12/29/2022 Clear  Clear Final    Specific Gravity, UA 12/29/2022 1.015   Final    pH, UA  12/29/2022 6.0  5.0 - 8.5 Final    Protein, UA 12/29/2022 Negative  Negative mg/dL Final    Glucose, UA 12/29/2022 Negative  Negative, Normal mg/dL Final    Ketones, UA 12/29/2022 Negative  Negative mg/dL Final    Blood, UA 12/29/2022 Negative  Negative unit/L Final    Bilirubin, UA 12/29/2022 Negative  Negative mg/dL Final    Urobilinogen, UA 12/29/2022 0.2  0.2, 1.0, Normal mg/dL Final    Nitrites, UA 12/29/2022 Negative  Negative Final    Leukocyte Esterase, UA 12/29/2022 Negative  Negative unit/L Final    Beta hCG Qualitative, Urine 12/29/2022 Negative  Negative Final    Sodium Level 12/29/2022 139  136 - 145 mmol/L Final    Potassium Level 12/29/2022 3.8  3.5 - 5.1 mmol/L Final    Chloride 12/29/2022 102  98 - 107 mmol/L Final    Carbon Dioxide 12/29/2022 28  22 - 29 mmol/L Final    Glucose Level 12/29/2022 97  74 - 100 mg/dL Final    Blood Urea Nitrogen 12/29/2022 11.0  7.0 - 18.7 mg/dL Final    Creatinine 12/29/2022 0.62  0.55 - 1.02 mg/dL Final    Calcium Level Total 12/29/2022 9.5  8.4 - 10.2 mg/dL Final    Protein Total 12/29/2022 7.0  6.4 - 8.3 gm/dL Final    Albumin Level 12/29/2022 3.8  3.5 - 5.0 g/dL Final    Globulin 12/29/2022 3.2  2.4 - 3.5 gm/dL Final    Albumin/Globulin Ratio 12/29/2022 1.2  1.1 - 2.0 ratio Final    Bilirubin Total 12/29/2022 0.3  <=1.5 mg/dL Final    Alkaline Phosphatase 12/29/2022 121  40 - 150 unit/L Final    Alanine Aminotransferase 12/29/2022 13  0 - 55 unit/L Final    Aspartate Aminotransferase 12/29/2022 9  5 - 34 unit/L Final    eGFR 12/29/2022 >60  mls/min/1.73/m2 Final    WBC 12/29/2022 7.5  4.5 - 11.5 x10(3)/mcL Final    RBC 12/29/2022 4.36  4.20 - 5.40 x10(6)/mcL Final    Hgb 12/29/2022 11.6 (L)  12.0 - 16.0 gm/dL Final    Hct 12/29/2022 36.3 (L)  37.0 - 47.0 % Final    MCV 12/29/2022 83.3  80.0 - 94.0 fL Final    MCH 12/29/2022 26.6  pg Final    MCHC 12/29/2022 32.0 (L)  33.0 - 36.0 mg/dL Final    RDW 12/29/2022 13.9  11.0 - 14.5 % Final    Platelet 12/29/2022 203   140 - 371 x10(3)/mcL Final    MPV 12/29/2022 9.8  9.4 - 12.4 fL Final    Neut % 12/29/2022 53.6  % Final    Lymph % 12/29/2022 35.7  % Final    Mono % 12/29/2022 7.8  % Final    Eos % 12/29/2022 2.1  % Final    Basophil % 12/29/2022 0.5  % Final    Lymph # 12/29/2022 2.69  0.6 - 4.6 x10(3)/mcL Final    Neut # 12/29/2022 4.03  2.1 - 9.2 x10(3)/mcL Final    Mono # 12/29/2022 0.59  0.1 - 1.3 x10(3)/mcL Final    Eos # 12/29/2022 0.16  0 - 0.9 x10(3)/mcL Final    Baso # 12/29/2022 0.04  0 - 0.2 x10(3)/mcL Final    IG# 12/29/2022 0.02  0 - 0.04 x10(3)/mcL Final    IG% 12/29/2022 0.3  % Final    NRBC% 12/29/2022 0.0  0 - 1 % Final       Imaging Results              CT Abdomen Pelvis  Without Contrast (Final result)  Result time 12/29/22 18:56:24      Final result by Marlon John MD (12/29/22 18:56:24)                   Impression:      No acute abdominopelvic findings on this noncontrast scan.      Electronically signed by: Marlon John  Date:    12/29/2022  Time:    18:56               Narrative:    EXAMINATION:  CT ABDOMEN PELVIS WITHOUT CONTRAST    CLINICAL HISTORY:  Abdominal pain, acute, nonlocalized;    TECHNIQUE:  Helical acquisition through the abdomen and pelvis without IV contrast.  Lack of contrast limits evaluation of solid organs and vascular structures .  Three plane reconstructions were provided for review.  mGycm. Automatic exposure control, adjustment of mA/kV or iterative reconstruction technique was used to reduce radiation.    COMPARISON:  No prior CT    FINDINGS:  The limited imaged lung bases are clear.    No significant abnormality of the noncontrast liver, spleen, pancreas or adrenals.  No hydronephrosis.  No urinary tract calculi seen.  Gallbladder surgically absent.    No bowel obstruction.  Normal appendix.  Moderate stool in the colon.    Mild bladder wall thickening but the bladder is not well distended.  No pelvic free fluid.  Abdominal aorta normal in caliber.    No acute osseous  findings.                                                                           Diagnostic Impression:    1. Female pelvic pain    2. Dysmenorrhea         ED Disposition Condition    Discharge Stable             Follow-up Information       [facility] - Emergency Dept.    Specialty: Emergency Medicine  Why: If symptoms worsen  Contact information:  [id number]  [facility] [id number] [phone]                            ED Prescriptions    None       Follow-up Information       Follow up With Specialties Details Why Contact Info    [facility] - Emergency Dept Emergency Medicine  If symptoms worsen [id number]  [facility] [id number] [phone]             Randolph Smith DO  12/30/22 0830

## 2023-04-17 ENCOUNTER — HOSPITAL ENCOUNTER (EMERGENCY)
Facility: HOSPITAL | Age: 33
Discharge: HOME | End: 2023-04-17
Attending: STUDENT IN AN ORGANIZED HEALTH CARE EDUCATION/TRAINING PROGRAM
Payer: MEDICAID

## 2023-04-17 VITALS
HEART RATE: 86 BPM | RESPIRATION RATE: 20 BRPM | SYSTOLIC BLOOD PRESSURE: 155 MMHG | BODY MASS INDEX: 48.82 KG/M2 | OXYGEN SATURATION: 99 % | HEIGHT: 65 IN | TEMPERATURE: 98 F | DIASTOLIC BLOOD PRESSURE: 95 MMHG | WEIGHT: 293 LBS

## 2023-04-17 DIAGNOSIS — N30.90 CYSTITIS: Primary | ICD-10-CM

## 2023-04-17 LAB
ALBUMIN SERPL-MCNC: 3.8 G/DL (ref 3.5–5)
ALBUMIN/GLOB SERPL: 1.3 RATIO (ref 1.1–2)
ALP SERPL-CCNC: 112 UNIT/L (ref 40–150)
ALT SERPL-CCNC: 10 UNIT/L (ref 0–55)
APPEARANCE UR: CLEAR
AST SERPL-CCNC: 10 UNIT/L (ref 5–34)
B-HCG SERPL QL: NEGATIVE
BASOPHILS # BLD AUTO: 0.05 X10(3)/MCL (ref 0–0.2)
BASOPHILS NFR BLD AUTO: 0.6 %
BILIRUB UR QL STRIP.AUTO: NEGATIVE MG/DL
BILIRUBIN DIRECT+TOT PNL SERPL-MCNC: 0.2 MG/DL
BUN SERPL-MCNC: 12.5 MG/DL (ref 7–18.7)
CALCIUM SERPL-MCNC: 9.1 MG/DL (ref 8.4–10.2)
CHLORIDE SERPL-SCNC: 105 MMOL/L (ref 98–107)
CO2 SERPL-SCNC: 25 MMOL/L (ref 22–29)
COLOR UR AUTO: NORMAL
CREAT SERPL-MCNC: 0.61 MG/DL (ref 0.55–1.02)
EOSINOPHIL # BLD AUTO: 0.29 X10(3)/MCL (ref 0–0.9)
EOSINOPHIL NFR BLD AUTO: 3.7 %
ERYTHROCYTE [DISTWIDTH] IN BLOOD BY AUTOMATED COUNT: 13.7 % (ref 11.5–17)
GFR SERPLBLD CREATININE-BSD FMLA CKD-EPI: >60 MLS/MIN/1.73/M2
GLOBULIN SER-MCNC: 3 GM/DL (ref 2.4–3.5)
GLUCOSE SERPL-MCNC: 102 MG/DL (ref 74–100)
GLUCOSE UR QL STRIP.AUTO: NEGATIVE MG/DL
HCT VFR BLD AUTO: 36.3 % (ref 37–47)
HGB BLD-MCNC: 11.4 G/DL (ref 12–16)
IMM GRANULOCYTES # BLD AUTO: 0.02 X10(3)/MCL (ref 0–0.04)
IMM GRANULOCYTES NFR BLD AUTO: 0.3 %
KETONES UR QL STRIP.AUTO: NEGATIVE MG/DL
LEUKOCYTE ESTERASE UR QL STRIP.AUTO: NEGATIVE UNIT/L
LIPASE SERPL-CCNC: 19 U/L
LYMPHOCYTES # BLD AUTO: 2.81 X10(3)/MCL (ref 0.6–4.6)
LYMPHOCYTES NFR BLD AUTO: 36 %
MCH RBC QN AUTO: 26.8 PG (ref 27–31)
MCHC RBC AUTO-ENTMCNC: 31.4 G/DL (ref 33–36)
MCV RBC AUTO: 85.2 FL (ref 80–94)
MONOCYTES # BLD AUTO: 0.41 X10(3)/MCL (ref 0.1–1.3)
MONOCYTES NFR BLD AUTO: 5.3 %
NEUTROPHILS # BLD AUTO: 4.22 X10(3)/MCL (ref 2.1–9.2)
NEUTROPHILS NFR BLD AUTO: 54.1 %
NITRITE UR QL STRIP.AUTO: NEGATIVE
NRBC BLD AUTO-RTO: 0 %
PH UR STRIP.AUTO: 6 [PH]
PLATELET # BLD AUTO: 178 X10(3)/MCL (ref 130–400)
PMV BLD AUTO: 10.1 FL (ref 7.4–10.4)
POTASSIUM SERPL-SCNC: 3.7 MMOL/L (ref 3.5–5.1)
PROT SERPL-MCNC: 6.8 GM/DL (ref 6.4–8.3)
PROT UR QL STRIP.AUTO: NEGATIVE MG/DL
RBC # BLD AUTO: 4.26 X10(6)/MCL (ref 4.2–5.4)
RBC UR QL AUTO: NEGATIVE UNIT/L
SODIUM SERPL-SCNC: 139 MMOL/L (ref 136–145)
SP GR UR STRIP.AUTO: 1.02
UROBILINOGEN UR STRIP-ACNC: 0.2 MG/DL
WBC # SPEC AUTO: 7.8 X10(3)/MCL (ref 4.5–11.5)

## 2023-04-17 PROCEDURE — 80053 COMPREHEN METABOLIC PANEL: CPT

## 2023-04-17 PROCEDURE — 25500020 PHARM REV CODE 255

## 2023-04-17 PROCEDURE — 85025 COMPLETE CBC W/AUTO DIFF WBC: CPT

## 2023-04-17 PROCEDURE — 63600175 PHARM REV CODE 636 W HCPCS

## 2023-04-17 PROCEDURE — 96375 TX/PRO/DX INJ NEW DRUG ADDON: CPT

## 2023-04-17 PROCEDURE — 96374 THER/PROPH/DIAG INJ IV PUSH: CPT | Mod: 59

## 2023-04-17 PROCEDURE — 81003 URINALYSIS AUTO W/O SCOPE: CPT | Performed by: EMERGENCY MEDICINE

## 2023-04-17 PROCEDURE — 83690 ASSAY OF LIPASE: CPT

## 2023-04-17 PROCEDURE — 81025 URINE PREGNANCY TEST: CPT | Performed by: EMERGENCY MEDICINE

## 2023-04-17 PROCEDURE — 99285 EMERGENCY DEPT VISIT HI MDM: CPT | Mod: 25

## 2023-04-17 RX ORDER — DICLOFENAC SODIUM 50 MG/1
50 TABLET, DELAYED RELEASE ORAL 2 TIMES DAILY PRN
Qty: 20 TABLET | Refills: 0 | Status: SHIPPED | OUTPATIENT
Start: 2023-04-17

## 2023-04-17 RX ORDER — ONDANSETRON 4 MG/1
4 TABLET, ORALLY DISINTEGRATING ORAL EVERY 6 HOURS PRN
Qty: 20 TABLET | Refills: 0 | Status: SHIPPED | OUTPATIENT
Start: 2023-04-17

## 2023-04-17 RX ORDER — ONDANSETRON 2 MG/ML
4 INJECTION INTRAMUSCULAR; INTRAVENOUS
Status: COMPLETED | OUTPATIENT
Start: 2023-04-17 | End: 2023-04-17

## 2023-04-17 RX ORDER — CEPHALEXIN 500 MG/1
500 CAPSULE ORAL 2 TIMES DAILY
Qty: 14 CAPSULE | Refills: 0 | Status: SHIPPED | OUTPATIENT
Start: 2023-04-17 | End: 2023-04-24

## 2023-04-17 RX ORDER — KETOROLAC TROMETHAMINE 30 MG/ML
30 INJECTION, SOLUTION INTRAMUSCULAR; INTRAVENOUS
Status: COMPLETED | OUTPATIENT
Start: 2023-04-17 | End: 2023-04-17

## 2023-04-17 RX ADMIN — KETOROLAC TROMETHAMINE 30 MG: 30 INJECTION, SOLUTION INTRAMUSCULAR; INTRAVENOUS at 07:04

## 2023-04-17 RX ADMIN — ONDANSETRON HYDROCHLORIDE 4 MG: 2 SOLUTION INTRAMUSCULAR; INTRAVENOUS at 07:04

## 2023-04-17 RX ADMIN — IOPAMIDOL 100 ML: 755 INJECTION, SOLUTION INTRAVENOUS at 08:04

## 2023-04-17 NOTE — ED PROVIDER NOTES
Encounter Date: 2023       History     Chief Complaint   Patient presents with    Abdominal Pain     Pt c/o abd and backpain onset yesterday. States has had nausea for three days.     The patient is a 33 y.o. female who presents to the Emergency Department with a chief complaint of lower abdominal pain. Symptoms began yesterday and have been constant since onset. Her pain is currently rated as a 5/10 in severity and described as aching with no radiation. Associated symptoms include nausea and vomiting. Symptoms are aggravated with movement and there are no alleviating factors. The patient denies fever or chills. She reports taking nothing prior to arrival with no relief of symptoms. No other reported symptoms at this time.      The history is provided by the patient. No  was used.   Abdominal Pain  The current episode started yesterday. The onset of the illness was gradual. The problem has not changed since onset.The abdominal pain is located in the suprapubic region. The abdominal pain does not radiate. The severity of the abdominal pain is 5/10. The abdominal pain is relieved by nothing. The other symptoms of the illness include nausea and vomiting. The other symptoms of the illness do not include fever, fatigue, shortness of breath or dysuria.   Nausea began yesterday. The nausea is exacerbated by food.   The vomiting began yesterday. The emesis contains stomach contents.   The patient states that she believes she is currently not pregnant. The patient has not had a change in bowel habit. Additional symptoms associated with the illness include back pain. Symptoms associated with the illness do not include chills, anorexia, diaphoresis, heartburn, urgency, hematuria or frequency.   Review of patient's allergies indicates:  No Known Allergies  Past Medical History:   Diagnosis Date    Seizures      Past Surgical History:   Procedure Laterality Date     SECTION N/A 2022     Procedure:  SECTION;  Surgeon: Andrew Merrill MD;  [facility];  Service: OB/GYN;  Laterality: N/A;    CHOLECYSTECTOMY      hemrroid surgery  2018     Family History   Problem Relation Age of Onset    Hyperlipidemia Mother     Colon cancer Maternal Grandmother      Social History     Tobacco Use    Smoking status: Former    Smokeless tobacco: Former   Substance Use Topics    Alcohol use: Not Currently    Drug use: Not Currently     Review of Systems   Constitutional:  Negative for chills, diaphoresis, fatigue and fever.   HENT:  Negative for sore throat.    Respiratory:  Negative for shortness of breath.    Cardiovascular:  Negative for chest pain.   Gastrointestinal:  Positive for abdominal pain, nausea and vomiting. Negative for anorexia and heartburn.   Genitourinary:  Negative for dysuria, frequency, hematuria and urgency.   Musculoskeletal:  Positive for back pain.   Skin:  Negative for rash.   Neurological:  Negative for weakness.   Hematological:  Does not bruise/bleed easily.   All other systems reviewed and are negative.    Physical Exam     Initial Vitals [23 1728]   BP Pulse Resp Temp SpO2   (!) 155/95 86 20 97.5 °F (36.4 °C) 99 %      MAP       --         Physical Exam    Nursing note and vitals reviewed.  Constitutional: She appears well-developed and well-nourished.   HENT:   Head: Normocephalic.   Right Ear: Hearing and tympanic membrane normal.   Left Ear: Hearing and tympanic membrane normal.   Mouth/Throat: Uvula is midline, oropharynx is clear and moist and mucous membranes are normal.   Cardiovascular:  Normal rate, regular rhythm, normal heart sounds and normal pulses.           Pulmonary/Chest: Effort normal and breath sounds normal.   Abdominal: Abdomen is soft. Bowel sounds are normal. There is abdominal tenderness in the suprapubic area.   Musculoskeletal:      Lumbar back: Tenderness present. No swelling or spasms. Normal range of motion.        Back:       Comments: All  other adjacent joints normal      Lymphadenopathy:     She has no cervical adenopathy.   Neurological: She is alert. GCS eye subscore is 4. GCS verbal subscore is 5. GCS motor subscore is 6.   Skin: Skin is warm, dry and intact. Capillary refill takes less than 2 seconds.       ED Course   Procedures  Labs Reviewed   COMPREHENSIVE METABOLIC PANEL - Abnormal; Notable for the following components:       Result Value    Glucose Level 102 (*)     All other components within normal limits   CBC WITH DIFFERENTIAL - Abnormal; Notable for the following components:    Hgb 11.4 (*)     Hct 36.3 (*)     MCH 26.8 (*)     MCHC 31.4 (*)     All other components within normal limits   PREGNANCY TEST, URINE RAPID - Normal   LIPASE - Normal   URINALYSIS, REFLEX TO URINE CULTURE   CBC W/ AUTO DIFFERENTIAL    Narrative:     The following orders were created for panel order CBC Auto Differential.  Procedure                               Abnormality         Status                     ---------                               -----------         ------                     CBC with Differential[[id number]]        Abnormal            Final result                 Please view results for these tests on the individual orders.          Imaging Results              CT Abdomen Pelvis With Contrast (Final result)  Result time 04/17/23 20:59:50      Final result by Marlon John MD (04/17/23 20:59:50)                   Impression:      1. Bladder wall thickening, question cystitis.  2. Otherwise no acute abdominopelvic findings. Chronic findings above.      Electronically signed by: Marlon John  Date:    04/17/2023  Time:    20:59               Narrative:    EXAMINATION:  CT ABDOMEN PELVIS WITH CONTRAST    CLINICAL HISTORY:  Abdominal pain, acute, nonlocalized;    TECHNIQUE:  Helical acquisition through the abdomen and pelvis with IV contrast.  Three plane reconstructions were provided for review.  mGycm. Automatic exposure control,  adjustment of mA/kV or iterative reconstruction technique was used to reduce radiation.    COMPARISON:  29 December 2022, 27 November 2020    FINDINGS:  The limited imaged lung bases are clear.    The liver is enlarged with the right hepatic lobe measuring 24 cm craniocaudad.  There is a small enhancing right hepatic lobe lesion on image 35 series 3.  This was also vaguely seen in 2020 on image 35 series 8.  Patent portal vein.  Gallbladder surgically absent.  The spleen is mildly enlarged.  No significant abnormality of the pancreas, adrenals or kidneys.    No bowel obstruction. No significant inflammatory changes of the bowel.  Normal appendix.  No free air.    There is some bladder wall thickening.  No significant pelvic free fluid.  Adnexa within normal limits for age.  Abdominal aorta normal in caliber.    There are no acute osseous findings.                                       Medications   ondansetron injection 4 mg (4 mg Intravenous Given 4/17/23 1935)   ketorolac injection 30 mg (30 mg Intravenous Given 4/17/23 1935)   iopamidoL (ISOVUE-370) injection 100 mL (100 mLs Intravenous Given 4/17/23 2048)     Medical Decision Making:   Initial Assessment:   The patient is a 33 y.o. female who presents to the Emergency Department with a chief complaint of lower abdominal pain. Symptoms began yesterday and have been constant since onset. Her pain is currently rated as a 5/10 in severity and described as aching with no radiation. Associated symptoms include nausea and vomiting. Symptoms are aggravated with movement and there are no alleviating factors. The patient denies fever or chills. She reports taking nothing prior to arrival with no relief of symptoms. No other reported symptoms at this time.    Differential Diagnosis:   Viral gastroenteritis, urinary tract infection, pregnancy, pyelonephritis, kidney stone, appendicitis, diverticulitis    Clinical Tests:   Lab Tests: Ordered and Reviewed  Radiological Study:  Ordered and Reviewed  ED Management:  MDM  History obtained by patient.   Co-morbidities and/or factors adding to the complexity or risk for the patient?: none  Differential diagnoses: Viral gastroenteritis, urinary tract infection, pregnancy, pyelonephritis, kidney stone, appendicitis, diverticulitis    Decision to obtain previous or outside records?: no  Chart Review (nursing home, outside records, CareEverywhere): none  Review of RX medications/new RX prescribed by me?: keflex, zofran, diclofenac   My EKG Interpretation: see above  Labs/imaging/other tests obtained/considered (risk/benefits of testing discussed): cbc, cmp, lipase, ua, ct abdomen  Labs/tests intepretation: labs, ua unremarkable; ct abdomen shows possible cystitis  My independent imaging interpretation: none  Treatment/interventions, IV fluids, IV medications: toradol, zofran  Complex management (IV controlled substances, went to OR, DNR, meds requiring monitoring, transfer, etc)?: none  Workup/treatment affected by social determinants of health?: none   Point of care US done/interpretation: none  Consults/radiologist/EMS/social work/family discussion/alternate history: none  Advanced care planning/end of life discussion: none  Shared decision making: shared decision making with patient  ETOH/smoking/drug cessation discussion: none  Dispo: discharge home            ED Course as of 04/17/23 2127 Mon Apr 17, 2023 2121 CT shows evidence of cystitis. UA is clean but patient is symptomatic. Will prophylactically treat with antibiotics.  [LM]      ED Course User Index  [LM] Denny Miramontes NP                 Clinical Impression:   Final diagnoses:  [N30.90] Cystitis (Primary)        ED Disposition Condition    Discharge Stable          ED Prescriptions       Medication Sig Dispense Start Date End Date Auth. Provider    cephALEXin (KEFLEX) 500 MG capsule Take 1 capsule (500 mg total) by mouth 2 (two) times a day. for 7 days 14 capsule 4/17/2023  4/24/2023 Denny Miramontes NP    ondansetron (ZOFRAN-ODT) 4 MG TbDL Take 1 tablet (4 mg total) by mouth every 6 (six) hours as needed (Nausea). 20 tablet 4/17/2023 -- Denny Miramontes NP    diclofenac (VOLTAREN) 50 MG EC tablet Take 1 tablet (50 mg total) by mouth 2 (two) times daily as needed (pain). 20 tablet 4/17/2023 -- Denny Miramontes NP          Follow-up Information       Follow up With Specialties Details Why Contact Info    Jamison Davila, DO Internal Medicine Schedule an appointment as soon as possible for a visit   [address] [place] [id number] [phone]               Denny Miramontes NP  04/17/23 3702

## 2023-04-18 NOTE — DISCHARGE INSTRUCTIONS
Thanks for letting us take care of you today!  It is our goal to give you courteous care and to keep you comfortable and informed, if you have any questions before you leave I will be happy to try and answer them.    Here is some advice after your visit:      Your visit in the emergency department is NOT definitive care - please follow-up with your primary care doctor and/or specialist within 1-2 days.  Please return if you have any worsening in your condition or if you have any other concerns.    If you had radiology exams like an XRAY or CT in the emergency Department the interpreation on them may be preliminary - there may be less time sensitive findings on the reports please obtain these reports within 24 hours from the hospital or by using your out on your mobile phone to access records.  Bring these to your primary care doctor and/or specialist for further review of incidental findings.    Please review any LAB WORK from your visit today with your primary care physician.    Please take the full course of  any ANTIBIOTICS you were prescribed - incomplete courses of antibiotics can cause resistance to antibiotics in the future which will make it difficult to treat any infections you may have.

## 2023-06-16 ENCOUNTER — HOSPITAL ENCOUNTER (EMERGENCY)
Facility: HOSPITAL | Age: 33
Discharge: HOME | End: 2023-06-16
Attending: EMERGENCY MEDICINE
Payer: MEDICAID

## 2023-06-16 VITALS
BODY MASS INDEX: 47.82 KG/M2 | HEART RATE: 76 BPM | HEIGHT: 65 IN | OXYGEN SATURATION: 97 % | WEIGHT: 287 LBS | SYSTOLIC BLOOD PRESSURE: 139 MMHG | RESPIRATION RATE: 20 BRPM | TEMPERATURE: 97 F | DIASTOLIC BLOOD PRESSURE: 100 MMHG

## 2023-06-16 DIAGNOSIS — R10.30 LOWER ABDOMINAL PAIN: Primary | ICD-10-CM

## 2023-06-16 LAB
ALBUMIN SERPL-MCNC: 4.1 G/DL (ref 3.5–5)
ALBUMIN/GLOB SERPL: 1.3 RATIO (ref 1.1–2)
ALP SERPL-CCNC: 115 UNIT/L (ref 40–150)
ALT SERPL-CCNC: 12 UNIT/L (ref 0–55)
APPEARANCE UR: CLEAR
AST SERPL-CCNC: 11 UNIT/L (ref 5–34)
B-HCG SERPL QL: NEGATIVE
BASOPHILS # BLD AUTO: 0.05 X10(3)/MCL
BASOPHILS NFR BLD AUTO: 0.7 %
BILIRUB UR QL STRIP.AUTO: NEGATIVE MG/DL
BILIRUBIN DIRECT+TOT PNL SERPL-MCNC: 0.3 MG/DL
BUN SERPL-MCNC: 10.2 MG/DL (ref 7–18.7)
CALCIUM SERPL-MCNC: 9.7 MG/DL (ref 8.4–10.2)
CHLORIDE SERPL-SCNC: 104 MMOL/L (ref 98–107)
CO2 SERPL-SCNC: 23 MMOL/L (ref 22–29)
COLOR UR: NORMAL
CREAT SERPL-MCNC: 0.63 MG/DL (ref 0.55–1.02)
EOSINOPHIL # BLD AUTO: 0.25 X10(3)/MCL (ref 0–0.9)
EOSINOPHIL NFR BLD AUTO: 3.7 %
ERYTHROCYTE [DISTWIDTH] IN BLOOD BY AUTOMATED COUNT: 13.8 % (ref 11.5–17)
GFR SERPLBLD CREATININE-BSD FMLA CKD-EPI: >60 MLS/MIN/1.73/M2
GLOBULIN SER-MCNC: 3.1 GM/DL (ref 2.4–3.5)
GLUCOSE SERPL-MCNC: 87 MG/DL (ref 74–100)
GLUCOSE UR QL STRIP.AUTO: NEGATIVE MG/DL
HCT VFR BLD AUTO: 37.3 % (ref 37–47)
HGB BLD-MCNC: 11.9 G/DL (ref 12–16)
IMM GRANULOCYTES # BLD AUTO: 0.02 X10(3)/MCL (ref 0–0.04)
IMM GRANULOCYTES NFR BLD AUTO: 0.3 %
KETONES UR QL STRIP.AUTO: NEGATIVE MG/DL
LEUKOCYTE ESTERASE UR QL STRIP.AUTO: NEGATIVE UNIT/L
LIPASE SERPL-CCNC: 12 U/L
LYMPHOCYTES # BLD AUTO: 2.81 X10(3)/MCL (ref 0.6–4.6)
LYMPHOCYTES NFR BLD AUTO: 42.1 %
MCH RBC QN AUTO: 26.8 PG (ref 27–31)
MCHC RBC AUTO-ENTMCNC: 31.9 G/DL (ref 33–36)
MCV RBC AUTO: 84 FL (ref 80–94)
MONOCYTES # BLD AUTO: 0.29 X10(3)/MCL (ref 0.1–1.3)
MONOCYTES NFR BLD AUTO: 4.3 %
NEUTROPHILS # BLD AUTO: 3.26 X10(3)/MCL (ref 2.1–9.2)
NEUTROPHILS NFR BLD AUTO: 48.9 %
NITRITE UR QL STRIP.AUTO: NEGATIVE
NRBC BLD AUTO-RTO: 0 %
PH UR STRIP.AUTO: 6.5 [PH]
PLATELET # BLD AUTO: 183 X10(3)/MCL (ref 130–400)
PMV BLD AUTO: 10.4 FL (ref 7.4–10.4)
POTASSIUM SERPL-SCNC: 4 MMOL/L (ref 3.5–5.1)
PROT SERPL-MCNC: 7.2 GM/DL (ref 6.4–8.3)
PROT UR QL STRIP.AUTO: NEGATIVE MG/DL
RBC # BLD AUTO: 4.44 X10(6)/MCL (ref 4.2–5.4)
RBC UR QL AUTO: NEGATIVE UNIT/L
SODIUM SERPL-SCNC: 139 MMOL/L (ref 136–145)
SP GR UR STRIP.AUTO: 1.02
UROBILINOGEN UR STRIP-ACNC: 0.2 MG/DL
WBC # SPEC AUTO: 6.68 X10(3)/MCL (ref 4.5–11.5)

## 2023-06-16 PROCEDURE — 83690 ASSAY OF LIPASE: CPT | Performed by: NURSE PRACTITIONER

## 2023-06-16 PROCEDURE — 85025 COMPLETE CBC W/AUTO DIFF WBC: CPT | Performed by: NURSE PRACTITIONER

## 2023-06-16 PROCEDURE — 81003 URINALYSIS AUTO W/O SCOPE: CPT | Performed by: EMERGENCY MEDICINE

## 2023-06-16 PROCEDURE — 81025 URINE PREGNANCY TEST: CPT | Performed by: EMERGENCY MEDICINE

## 2023-06-16 PROCEDURE — 99283 EMERGENCY DEPT VISIT LOW MDM: CPT

## 2023-06-16 PROCEDURE — 80053 COMPREHEN METABOLIC PANEL: CPT | Performed by: NURSE PRACTITIONER

## 2023-06-16 RX ORDER — IBUPROFEN 600 MG/1
600 TABLET ORAL EVERY 8 HOURS PRN
Qty: 15 TABLET | Refills: 0 | Status: SHIPPED | OUTPATIENT
Start: 2023-06-16

## 2023-06-16 NOTE — Clinical Note
"Aza "Hill" Cuba was seen and treated in our emergency department on 6/16/2023.  She may return to work on 06/17/2023.       If you have any questions or concerns, please don't hesitate to call.      AASHISH Gold"

## 2023-06-16 NOTE — ED PROVIDER NOTES
Encounter Date: 2023       History     Chief Complaint   Patient presents with    Abdominal Pain     Pt c/o intermittent low abd pain onset one week ago.     Patient states lower abdominal pain intermittently x 4 days. States nausea. Denies any vomiting, fever, dysuria, discharge, or diarrhea. States that pain is sharp.     The history is provided by the patient. No  was used.   Abdominal Pain  The current episode started several days ago. The onset of the illness was gradual. The abdominal pain is located in the suprapubic region. The severity of the abdominal pain is 8/10. The abdominal pain is relieved by nothing. The other symptoms of the illness include nausea. The other symptoms of the illness do not include fever, vomiting or diarrhea.   Symptoms associated with the illness do not include chills.   Review of patient's allergies indicates:  No Known Allergies  Past Medical History:   Diagnosis Date    Seizures      Past Surgical History:   Procedure Laterality Date     SECTION N/A 2022    Procedure:  SECTION;  Surgeon: Andrew Merrill MD;  [facility]: [facility];  Service: OB/GYN;  Laterality: N/A;    CHOLECYSTECTOMY      hemrroid surgery  2018     Family History   Problem Relation Age of Onset    Hyperlipidemia Mother     Colon cancer Maternal Grandmother      Social History     Tobacco Use    Smoking status: Former    Smokeless tobacco: Former   Substance Use Topics    Alcohol use: Not Currently    Drug use: Not Currently     Review of Systems   Constitutional: Negative.  Negative for chills and fever.   HENT: Negative.     Eyes: Negative.    Respiratory: Negative.     Cardiovascular: Negative.    Gastrointestinal:  Positive for abdominal pain and nausea. Negative for diarrhea and vomiting.   Endocrine: Negative.    Genitourinary: Negative.    Musculoskeletal: Negative.    Skin: Negative.    Allergic/Immunologic: Negative.    Neurological: Negative.    Hematological:  Negative.    Psychiatric/Behavioral: Negative.     All other systems reviewed and are negative.    Physical Exam     Initial Vitals [06/16/23 1712]   BP Pulse Resp Temp SpO2   (!) 139/100 76 20 97.3 °F (36.3 °C) 97 %      MAP       --         Physical Exam    Nursing note and vitals reviewed.  Constitutional: She appears well-developed and well-nourished. No distress.   HENT:   Head: Normocephalic and atraumatic.   Mouth/Throat: Oropharynx is clear and moist.   Eyes: Conjunctivae and EOM are normal. Pupils are equal, round, and reactive to light.   Neck: Neck supple.   Normal range of motion.  Cardiovascular:  Normal rate, regular rhythm, normal heart sounds and intact distal pulses.           Pulmonary/Chest: Breath sounds normal. No respiratory distress. She has no wheezes.   Abdominal: Abdomen is soft. Bowel sounds are normal. She exhibits no distension. There is abdominal tenderness in the suprapubic area.   Musculoskeletal:         General: No tenderness or edema. Normal range of motion.      Cervical back: Normal range of motion and neck supple.     Neurological: She is alert and oriented to person, place, and time. She has normal strength. GCS score is 15. GCS eye subscore is 4. GCS verbal subscore is 5. GCS motor subscore is 6.   Skin: Skin is warm and dry. No rash noted.   Psychiatric: She has a normal mood and affect. Thought content normal.       ED Course   Procedures  Labs Reviewed   CBC WITH DIFFERENTIAL - Abnormal; Notable for the following components:       Result Value    Hgb 11.9 (*)     MCH 26.8 (*)     MCHC 31.9 (*)     All other components within normal limits   PREGNANCY TEST, URINE RAPID - Normal   LIPASE - Normal   URINALYSIS, REFLEX TO URINE CULTURE   CBC W/ AUTO DIFFERENTIAL    Narrative:     The following orders were created for panel order CBC Auto Differential.  Procedure                               Abnormality         Status                     ---------                                -----------         ------                     CBC with Differential[[id number]]        Abnormal            Final result                 Please view results for these tests on the individual orders.   COMPREHENSIVE METABOLIC PANEL          Imaging Results    None          Medications - No data to display  Medical Decision Making:   Initial Assessment:   Patient states lower abdominal pain intermittently x 4 days. States nausea. Denies any vomiting, fever, dysuria, discharge, or diarrhea. States that pain is sharp.     The history is provided by the patient. No  was used.   Abdominal Pain  The current episode started several days ago. The onset of the illness was gradual. The abdominal pain is located in the suprapubic region. The severity of the abdominal pain is 8/10. The abdominal pain is relieved by nothing. The other symptoms of the illness include nausea. The other symptoms of the illness do not include fever, vomiting or diarrhea.   Symptoms associated with the illness do not include chills.   Patient is awake, alert, afebrile, and nontoxic appearing in the ED.   Differential Diagnosis:   Abdominal Pain, UTI, Cystitis, Dysmenorrhea   Clinical Tests:   Lab Tests: Ordered and Reviewed  The following lab test(s) were unremarkable: CBC, CMP, Urinalysis, UPT and Lipase  ED Management:  Patient's labs are unremarkable. In reviewing patients chart she has had 2 negative CT scans of her abdomen/pelvis in the last 6 months. Patient states that she does tend to have this pain chronically related to her menstrual cycles and states that her cycles have been irregular. Will discharge patient and have her follow-up with her GYN. Patient was given ED return precautions.                         Clinical Impression:   Final diagnoses:  [R10.30] Lower abdominal pain (Primary)        ED Disposition Condition    Discharge Stable          ED Prescriptions       Medication Sig Dispense Start Date End Date Auth.  Provider    ibuprofen (ADVIL,MOTRIN) 600 MG tablet Take 1 tablet (600 mg total) by mouth every 8 (eight) hours as needed for Pain. 15 tablet 6/16/2023 -- AASHISH Gold          Follow-up Information       Follow up With Specialties Details Why Contact Info    Jamison Davila DO Internal Medicine In 3 days  [address] [facility] [address] [phone]      Andrew Merrill MD Obstetrics and Gynecology In 3 days  [address] [facility] [id number] [phone]               AASHISH Gold  06/16/23 5040

## 2023-11-28 ENCOUNTER — HOSPITAL ENCOUNTER (EMERGENCY)
Facility: HOSPITAL | Age: 33
Discharge: HOME | End: 2023-11-28
Attending: EMERGENCY MEDICINE
Payer: MEDICAID

## 2023-11-28 VITALS
OXYGEN SATURATION: 100 % | RESPIRATION RATE: 18 BRPM | BODY MASS INDEX: 44.93 KG/M2 | TEMPERATURE: 98 F | WEIGHT: 270 LBS | DIASTOLIC BLOOD PRESSURE: 75 MMHG | HEART RATE: 86 BPM | SYSTOLIC BLOOD PRESSURE: 106 MMHG

## 2023-11-28 DIAGNOSIS — A08.4 VIRAL GASTROENTERITIS: ICD-10-CM

## 2023-11-28 DIAGNOSIS — R10.9 ABDOMINAL CRAMPING: Primary | ICD-10-CM

## 2023-11-28 LAB
ALBUMIN SERPL-MCNC: 4 G/DL (ref 3.5–5)
ALBUMIN/GLOB SERPL: 1.3 RATIO (ref 1.1–2)
ALP SERPL-CCNC: 132 UNIT/L (ref 40–150)
ALT SERPL-CCNC: 12 UNIT/L (ref 0–55)
APPEARANCE UR: CLEAR
AST SERPL-CCNC: 12 UNIT/L (ref 5–34)
B-HCG SERPL QL: NEGATIVE
BACTERIA #/AREA URNS AUTO: NORMAL /HPF
BASOPHILS # BLD AUTO: 0.03 X10(3)/MCL
BASOPHILS NFR BLD AUTO: 0.4 %
BILIRUB SERPL-MCNC: 0.4 MG/DL
BILIRUB UR QL STRIP.AUTO: NEGATIVE
BUN SERPL-MCNC: 15.1 MG/DL (ref 7–18.7)
C TRACH DNA SPEC QL NAA+PROBE: NOT DETECTED
CALCIUM SERPL-MCNC: 8.8 MG/DL (ref 8.4–10.2)
CHLORIDE SERPL-SCNC: 107 MMOL/L (ref 98–107)
CO2 SERPL-SCNC: 24 MMOL/L (ref 22–29)
COLOR UR AUTO: NORMAL
CREAT SERPL-MCNC: 0.62 MG/DL (ref 0.55–1.02)
EOSINOPHIL # BLD AUTO: 0.12 X10(3)/MCL (ref 0–0.9)
EOSINOPHIL NFR BLD AUTO: 1.7 %
ERYTHROCYTE [DISTWIDTH] IN BLOOD BY AUTOMATED COUNT: 14.2 % (ref 11.5–17)
GFR SERPLBLD CREATININE-BSD FMLA CKD-EPI: >60 MLS/MIN/1.73/M2
GLOBULIN SER-MCNC: 3.1 GM/DL (ref 2.4–3.5)
GLUCOSE SERPL-MCNC: 107 MG/DL (ref 74–100)
GLUCOSE UR QL STRIP.AUTO: NORMAL
HCT VFR BLD AUTO: 38.6 % (ref 37–47)
HGB BLD-MCNC: 12.1 G/DL (ref 12–16)
IMM GRANULOCYTES # BLD AUTO: 0.02 X10(3)/MCL (ref 0–0.04)
IMM GRANULOCYTES NFR BLD AUTO: 0.3 %
KETONES UR QL STRIP.AUTO: NEGATIVE
LEUKOCYTE ESTERASE UR QL STRIP.AUTO: NEGATIVE
LYMPHOCYTES # BLD AUTO: 2.49 X10(3)/MCL (ref 0.6–4.6)
LYMPHOCYTES NFR BLD AUTO: 34.3 %
MCH RBC QN AUTO: 26.1 PG (ref 27–31)
MCHC RBC AUTO-ENTMCNC: 31.3 G/DL (ref 33–36)
MCV RBC AUTO: 83.4 FL (ref 80–94)
MONOCYTES # BLD AUTO: 0.41 X10(3)/MCL (ref 0.1–1.3)
MONOCYTES NFR BLD AUTO: 5.6 %
N GONORRHOEA DNA SPEC QL NAA+PROBE: NOT DETECTED
NEUTROPHILS # BLD AUTO: 4.19 X10(3)/MCL (ref 2.1–9.2)
NEUTROPHILS NFR BLD AUTO: 57.7 %
NITRITE UR QL STRIP.AUTO: NEGATIVE
NRBC BLD AUTO-RTO: 0 %
PH UR STRIP.AUTO: 6.5 [PH]
PLATELET # BLD AUTO: 194 X10(3)/MCL (ref 130–400)
PMV BLD AUTO: 9.8 FL (ref 7.4–10.4)
POTASSIUM SERPL-SCNC: 4.3 MMOL/L (ref 3.5–5.1)
PROT SERPL-MCNC: 7.1 GM/DL (ref 6.4–8.3)
PROT UR QL STRIP.AUTO: NEGATIVE
RBC # BLD AUTO: 4.63 X10(6)/MCL (ref 4.2–5.4)
RBC #/AREA URNS AUTO: NORMAL /HPF
RBC UR QL AUTO: NEGATIVE
SODIUM SERPL-SCNC: 139 MMOL/L (ref 136–145)
SOURCE (OHS): NORMAL
SP GR UR STRIP.AUTO: 1.03 (ref 1–1.03)
SQUAMOUS #/AREA URNS LPF: NORMAL /HPF
UROBILINOGEN UR STRIP-ACNC: NORMAL
WBC # SPEC AUTO: 7.26 X10(3)/MCL (ref 4.5–11.5)
WBC #/AREA URNS AUTO: NORMAL /HPF

## 2023-11-28 PROCEDURE — 87491 CHLMYD TRACH DNA AMP PROBE: CPT | Performed by: PHYSICIAN ASSISTANT

## 2023-11-28 PROCEDURE — 81025 URINE PREGNANCY TEST: CPT | Performed by: PHYSICIAN ASSISTANT

## 2023-11-28 PROCEDURE — 81001 URINALYSIS AUTO W/SCOPE: CPT | Performed by: PHYSICIAN ASSISTANT

## 2023-11-28 PROCEDURE — 80053 COMPREHEN METABOLIC PANEL: CPT | Performed by: PHYSICIAN ASSISTANT

## 2023-11-28 PROCEDURE — 85025 COMPLETE CBC W/AUTO DIFF WBC: CPT | Performed by: PHYSICIAN ASSISTANT

## 2023-11-28 PROCEDURE — 25000003 PHARM REV CODE 250: Performed by: PHYSICIAN ASSISTANT

## 2023-11-28 PROCEDURE — 99283 EMERGENCY DEPT VISIT LOW MDM: CPT

## 2023-11-28 RX ORDER — ONDANSETRON 4 MG/1
4 TABLET, FILM COATED ORAL EVERY 6 HOURS
Qty: 12 TABLET | Refills: 0 | Status: SHIPPED | OUTPATIENT
Start: 2023-11-28

## 2023-11-28 RX ORDER — LIDOCAINE HYDROCHLORIDE 10 MG/ML
100 INJECTION, SOLUTION EPIDURAL; INFILTRATION; INTRACAUDAL; PERINEURAL
Status: DISCONTINUED | OUTPATIENT
Start: 2023-11-28 | End: 2023-11-28

## 2023-11-28 RX ORDER — ONDANSETRON 4 MG/1
8 TABLET, ORALLY DISINTEGRATING ORAL
Status: COMPLETED | OUTPATIENT
Start: 2023-11-28 | End: 2023-11-28

## 2023-11-28 RX ADMIN — ONDANSETRON 8 MG: 4 TABLET, ORALLY DISINTEGRATING ORAL at 03:11

## 2023-11-28 NOTE — ED PROVIDER NOTES
Encounter Date: 2023       History     Chief Complaint   Patient presents with    Abdominal Pain     Pt reports lower abdominal/back pain & light pink vaginal discharge x5 days ago. Denies swelling/itching/irritation/burning/vomiting. LMP unknown, pt breastfeeding.      33 y.o. female presents to the ED with lower abdominal cramping with white vaginal discharge onset 5 days ago with intermittent nausea. Does not have regular periods since she is currently breast feeding; states she had bleeding twice however none recently. Denies irritation, dysuria, vomiting, fever, chills. No new sexual partners.     The history is provided by the patient. No  was used.     Review of patient's allergies indicates:  No Known Allergies  Past Medical History:   Diagnosis Date    Seizures      Past Surgical History:   Procedure Laterality Date     SECTION N/A 2022    Procedure:  SECTION;  Surgeon: Andrew Merrill MD;  [facility]: [facility];  Service: OB/GYN;  Laterality: N/A;    CHOLECYSTECTOMY      hemrroid surgery  2018     Family History   Problem Relation Age of Onset    Hyperlipidemia Mother     Colon cancer Maternal Grandmother      Social History     Tobacco Use    Smoking status: Former    Smokeless tobacco: Former   Substance Use Topics    Alcohol use: Not Currently    Drug use: Not Currently     Review of Systems   Constitutional:  Negative for chills and fever.   Eyes:  Negative for visual disturbance.   Respiratory:  Negative for cough and shortness of breath.    Cardiovascular:  Negative for chest pain.   Gastrointestinal:  Positive for abdominal pain, diarrhea and nausea. Negative for vomiting.   Genitourinary:  Positive for vaginal bleeding and vaginal discharge. Negative for dysuria.   Musculoskeletal:  Negative for arthralgias.   Skin:  Negative for color change and rash.   Neurological:  Negative for dizziness and headaches.   Psychiatric/Behavioral:  Negative for behavioral  problems.    All other systems reviewed and are negative.      Physical Exam     Initial Vitals [11/28/23 1136]   BP Pulse Resp Temp SpO2   118/82 86 16 98.1 °F (36.7 °C) 97 %      MAP       --         Physical Exam    Nursing note and vitals reviewed.  Constitutional: She appears well-developed and well-nourished.   HENT:   Head: Normocephalic and atraumatic.   Eyes: EOM are normal. Pupils are equal, round, and reactive to light.   Neck: Neck supple.   Cardiovascular:  Normal rate, regular rhythm and normal heart sounds.           Pulmonary/Chest: Breath sounds normal.   Abdominal: Abdomen is soft. Bowel sounds are normal. She exhibits no distension. There is no abdominal tenderness. There is no rebound.   Genitourinary:    Genitourinary Comments:  exam deferred     Musculoskeletal:         General: Normal range of motion.      Cervical back: Neck supple.     Neurological: She is alert and oriented to person, place, and time. She has normal strength. GCS score is 15. GCS eye subscore is 4. GCS verbal subscore is 5. GCS motor subscore is 6.   Skin: Skin is warm and dry.   Psychiatric: She has a normal mood and affect.         ED Course   Procedures  Labs Reviewed   COMPREHENSIVE METABOLIC PANEL - Abnormal; Notable for the following components:       Result Value    Glucose Level 107 (*)     All other components within normal limits   CBC WITH DIFFERENTIAL - Abnormal; Notable for the following components:    MCH 26.1 (*)     MCHC 31.3 (*)     All other components within normal limits   URINALYSIS, REFLEX TO URINE CULTURE - Normal   PREGNANCY TEST, URINE RAPID - Normal   CHLAMYDIA/GONORRHOEAE(GC), PCR    Narrative:     The Xpert CT/NG test, performed on the [employer] system is a qualitative in vitro real-time polymerase chain reaction (PCR) test for the automated detected and differentiation for genomic DNA from Chlamydia trachomatis (CT) and/or Neisseria gonorrhoeae (NG).   CBC W/ AUTO DIFFERENTIAL    Narrative:      The following orders were created for panel order CBC auto differential.  Procedure                               Abnormality         Status                     ---------                               -----------         ------                     CBC with Differential[[id number]]        Abnormal            Final result                 Please view results for these tests on the individual orders.          Imaging Results    None          Medications   ondansetron disintegrating tablet 8 mg (8 mg Oral Given 11/28/23 1529)     Medical Decision Making  Differential diagnosis: UTI, STD, menstrual cycle, ovarian cyst, dysmenorrhea     33 y.o. female presents to the ED with lower abdominal cramping with white vaginal discharge onset 5 days ago with intermittent nausea. Does not have regular periods since she is currently breast feeding; states she had bleeding twice however none recently. Denies irritation, dysuria, vomiting, fever, chills. No new sexual partners.       Amount and/or Complexity of Data Reviewed  Labs: ordered.    Risk  Prescription drug management.               ED Course as of 11/28/23 1729   Tue Nov 28, 2023   1527 Patient states she is very nauseous, more than when she first arrived int he ED. States she thinks she may have picked something up from someone she was sitting by in waiting room. Zofran ordered. Discussed benign labs/UA with patient. States she did bleed a little yesterday however notes it stopped and it was more blood-tinged discharge today. Did offer US of the pelvis due to her lower midline pelvic pain which she declined at this time. States she believes it to be her cycle trying to start  [MA]   1610 States nausea is improved however states she just had episode of diarrhea. Did offer other medication as well as covid/flu swab which she declined. Will d/c home with zofran RX [MA]      ED Course User Index  [MA] Braulio Shook, DONTRELL          /75 (BP Location: Right arm, Patient  Position: Sitting)   Pulse 86   Temp 98.1 °F (36.7 °C) (Oral)   Resp 18   Wt 122.5 kg (270 lb)   SpO2 100%   BMI 44.93 kg/m²                    Clinical Impression:  Final diagnoses:  [R10.9] Abdominal cramping (Primary)  [A08.4] Viral gastroenteritis          ED Disposition Condition    Discharge Stable          ED Prescriptions       Medication Sig Dispense Start Date End Date Auth. Provider    ondansetron (ZOFRAN) 4 MG tablet Take 1 tablet (4 mg total) by mouth every 6 (six) hours. 12 tablet 11/28/2023 -- Braulio Shook PA-C          Follow-up Information       Follow up With Specialties Details Why Contact Info    Jamison Davila,  Internal Medicine   [address] [place] [address] [phone]      [facility] - Emergency Dept Emergency Medicine In 1 week If symptoms worsen [facility] [facility] [id number] [phone]             Braulio Shook PA-C  11/28/23 5114

## 2023-11-28 NOTE — FIRST PROVIDER EVALUATION
Medical screening examination initiated.  I have conducted a focused provider triage encounter, findings are as follows:    Chief Complaint   Patient presents with    Abdominal Pain     Pt reports lower abdominal/back pain & light pink vaginal discharge x5 days ago. Denies swelling/itching/irritation/burning/vomiting. LMP unknown, pt breastfeeding.      Brief history of present illness:  33 y.o. female presents to the ED with lower abdominal cramping with white vaginal discharge onset 5 days ago with nausea. Does not have regular periods since she is breast feeding. Denies irritation, dysuria, vomiting.     Vitals:    11/28/23 1136   BP: 118/82   Pulse: 86   Resp: 16   Temp: 98.1 °F (36.7 °C)   TempSrc: Oral   SpO2: 97%   Weight: 122.5 kg (270 lb)     Pertinent physical exam:  Awake, alert, ambulatory, non-labored respirations    Brief workup plan: labs, UA    Preliminary workup initiated; this workup will be continued and followed by the physician or advanced practice provider that is assigned to the patient when roomed.

## 2024-05-13 ENCOUNTER — HOSPITAL ENCOUNTER (EMERGENCY)
Facility: HOSPITAL | Age: 34
Discharge: HOME | End: 2024-05-13
Attending: EMERGENCY MEDICINE

## 2024-05-13 VITALS
TEMPERATURE: 98 F | RESPIRATION RATE: 18 BRPM | HEIGHT: 65 IN | WEIGHT: 288.94 LBS | SYSTOLIC BLOOD PRESSURE: 124 MMHG | DIASTOLIC BLOOD PRESSURE: 84 MMHG | BODY MASS INDEX: 48.14 KG/M2 | HEART RATE: 85 BPM | OXYGEN SATURATION: 99 %
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
## 2024-05-13 DIAGNOSIS — S83.422A SPRAIN OF LATERAL COLLATERAL LIGAMENT OF LEFT KNEE, INITIAL ENCOUNTER: Primary | ICD-10-CM

## 2024-05-13 PROCEDURE — 99283 EMERGENCY DEPT VISIT LOW MDM: CPT | Mod: 25

## 2024-05-13 RX ORDER — DICLOFENAC SODIUM 50 MG/1
50 TABLET, DELAYED RELEASE ORAL 2 TIMES DAILY PRN
Qty: 20 TABLET | Refills: 0 | Status: SHIPPED | OUTPATIENT
Start: 2024-05-13

## 2024-05-13 NOTE — ED PROVIDER NOTES
Encounter Date: 2024       History     Chief Complaint   Patient presents with    Knee Pain     States twisted left knee on Friday morning.  Has iced it, took Ibuprofen and Tylenol without relief.  States worsening.  States left knee feels unstable.       Patient was ago and fell off of a [employer] truck.  Her knee twisted and now she has pain on the outside of her knee.  She rates her pain 8/10 with movement.  Resting it makes it better.  She feels like it is loose when she walks    The history is provided by the patient. No  was used.     Review of patient's allergies indicates:  No Known Allergies  Past Medical History:   Diagnosis Date    Seizures      Past Surgical History:   Procedure Laterality Date     SECTION N/A 2022    Procedure:  SECTION;  Surgeon: Andrew Merrill MD;  [facility];  Service: OB/GYN;  Laterality: N/A;    CHOLECYSTECTOMY      hemrroid surgery  2018     Family History   Problem Relation Name Age of Onset    Hyperlipidemia Mother      Colon cancer Maternal Grandmother       Social History     Tobacco Use    Smoking status: Former    Smokeless tobacco: Former   Substance Use Topics    Alcohol use: Yes    Drug use: Yes     Types: Marijuana     Comment: THC/CBD Gummies     Review of Systems   Constitutional:  Negative for fever.   HENT:  Negative for sore throat.    Respiratory:  Negative for shortness of breath.    Cardiovascular:  Negative for chest pain.   Gastrointestinal:  Negative for nausea.   Genitourinary:  Negative for dysuria.   Musculoskeletal:  Positive for arthralgias and gait problem. Negative for back pain.   Skin:  Negative for rash.   Neurological:  Negative for weakness.   Hematological:  Does not bruise/bleed easily.       Physical Exam     Initial Vitals [24 0046]   BP Pulse Resp Temp SpO2   127/74 88 17 98.2 °F (36.8 °C) 99 %      MAP       --         Physical Exam    Nursing note and vitals reviewed.  Constitutional:  She appears well-developed and well-nourished.   HENT:   Head: Normocephalic and atraumatic.   Eyes: Conjunctivae and EOM are normal. Pupils are equal, round, and reactive to light.   Neck: Neck supple.   Normal range of motion.  Cardiovascular:  Normal rate and regular rhythm.           Pulmonary/Chest: Breath sounds normal. No respiratory distress.   Abdominal: Abdomen is soft. Bowel sounds are normal. She exhibits no distension. There is no abdominal tenderness.   Musculoskeletal:         General: No edema. Normal range of motion.      Cervical back: Normal range of motion and neck supple.      Right knee: Normal.      Left knee: Tenderness present over the LCL. LCL laxity present.      Comments: Slight laxity and significant pain with varus stress testing     Neurological: She is alert and oriented to person, place, and time. She has normal strength.   Skin: Skin is warm and dry.   Psychiatric: Thought content normal.         ED Course   Procedures  Labs Reviewed - No data to display       Imaging Results              X-Ray Knee 3 View Left (In process)                   X-Rays:   Independently Interpreted Readings:   Other Readings:  X-ray of the left knee showed no evidence of fracture, dislocation, or joint effusion.  Joint space is well-maintained.  No subluxation of the patella.    Medications - No data to display  Medical Decision Making  Patient with complaints of left lateral knee pain after falling off a [employer] truck at work.  She does have some lateral laxity on exam.  There is nothing fractured or dislocated on x-ray.  I will send her home with diclofenac.  If she continues to have issues she is to follow up with primary care or orthopedic surgery.  ER precautions were given    Amount and/or Complexity of Data Reviewed  Radiology: ordered and independent interpretation performed. Decision-making details documented in ED Course.     Details: X-ray of the left knee showed no evidence of fracture,  dislocation, or joint effusion.  Joint space is well-maintained.  No subluxation of the patella.    Risk  Prescription drug management.  Risk Details: Risk Details: Given strict ED return precautions. I have spoken with the patient and/or caregivers. I have explained the patient's condition, diagnoses and treatment plan based on the information available to me at this time. I have answered the patient's and/or caregiver's questions and addressed any concerns. The patient and/or caregivers have as good an understanding of the patient's diagnosis, condition and treatment plan as can be expected at this point. The vital signs have been stable. The patient's condition is stable and appropriate for discharge from the emergency department.      The patient will pursue further outpatient evaluation with the primary care physician or other designated or consulting physician as outlined in the discharge instructions. The patient and/or caregivers are agreeable to this plan of care and follow-up instructions have been explained in detail. The patient and/or caregivers have received these instructions in written format and have expressed an understanding of the discharge instructions. The patient and/or caregivers are aware that any significant change in condition or worsening of symptoms should prompt an immediate return to this or the closest emergency department or a call to 911.           Additional MDM:   Differential Diagnosis:   LCL tear, tibial plateau fracture                              It is important that you follow up with your primary care provider or specialist if indicated for further evaluation, workup, and treatment as necessary. The exam and treatment you received in Emergency Department was for an urgent problem and NOT INTENDED AS COMPLETE CARE. It is important that you FOLLOW UP with a doctor for ongoing care. If your symptoms become WORSE or you DO NOT IMPROVE and you are unable to reach your health  care provider, you should RETURN to the Emergency Department        Clinical Impression:  Final diagnoses:  [W19.XXXA] Fall  [S83.422A] Sprain of lateral collateral ligament of left knee, initial encounter (Primary)          ED Disposition Condition    Discharge Stable          ED Prescriptions       Medication Sig Dispense Start Date End Date Auth. Provider    diclofenac (VOLTAREN) 50 MG EC tablet Take 1 tablet (50 mg total) by mouth 2 (two) times daily as needed (pain). 20 tablet 5/13/2024 -- Jodie Fernandez FNP          Follow-up Information       Follow up With Specialties Details Why Contact Info    Jamison Davila, DO Internal Medicine Schedule an appointment as soon as possible for a visit  As needed, If symptoms worsen [address] [facility] [address] [phone]               Jodie Fernandez FNP  05/13/24 0209

## 2024-05-13 NOTE — Clinical Note
"Hill Mckinney" [place] was seen and treated in our emergency department on 5/13/2024.  She may return to work on 05/15/2024.       If you have any questions or concerns, please don't hesitate to call.      Fede PEREZ    "

## 2025-03-11 ENCOUNTER — HOSPITAL ENCOUNTER (EMERGENCY)
Facility: HOSPITAL | Age: 35
Discharge: HOME | End: 2025-03-11
Attending: EMERGENCY MEDICINE
Payer: MEDICAID

## 2025-03-11 VITALS
RESPIRATION RATE: 2 BRPM | DIASTOLIC BLOOD PRESSURE: 86 MMHG | TEMPERATURE: 98 F | HEART RATE: 20 BPM | HEIGHT: 64 IN | BODY MASS INDEX: 46.1 KG/M2 | OXYGEN SATURATION: 99 % | WEIGHT: 270 LBS | SYSTOLIC BLOOD PRESSURE: 139 MMHG

## 2025-03-11 DIAGNOSIS — N93.8 DYSFUNCTIONAL UTERINE BLEEDING: Primary | ICD-10-CM

## 2025-03-11 LAB
B-HCG UR QL: NEGATIVE
BACTERIA #/AREA URNS AUTO: ABNORMAL /HPF
BASOPHILS # BLD AUTO: 0.03 X10(3)/MCL
BASOPHILS NFR BLD AUTO: 0.5 %
BILIRUB UR QL STRIP.AUTO: NEGATIVE
CLARITY UR: ABNORMAL
COLOR UR AUTO: ABNORMAL
EOSINOPHIL # BLD AUTO: 0.15 X10(3)/MCL (ref 0–0.9)
EOSINOPHIL NFR BLD AUTO: 2.5 %
ERYTHROCYTE [DISTWIDTH] IN BLOOD BY AUTOMATED COUNT: 15 % (ref 11.5–17)
GLUCOSE UR QL STRIP: NEGATIVE
GROUP & RH: NORMAL
HCT VFR BLD AUTO: 33.9 % (ref 37–47)
HGB BLD-MCNC: 10.6 G/DL (ref 12–16)
HGB UR QL STRIP: ABNORMAL
IMM GRANULOCYTES # BLD AUTO: 0.01 X10(3)/MCL (ref 0–0.04)
IMM GRANULOCYTES NFR BLD AUTO: 0.2 %
INDIRECT COOMBS: NORMAL
KETONES UR QL STRIP: NEGATIVE
LEUKOCYTE ESTERASE UR QL STRIP: ABNORMAL
LYMPHOCYTES # BLD AUTO: 2.4 X10(3)/MCL (ref 0.6–4.6)
LYMPHOCYTES NFR BLD AUTO: 40.5 %
MCH RBC QN AUTO: 24.1 PG (ref 27–31)
MCHC RBC AUTO-ENTMCNC: 31.3 G/DL (ref 33–36)
MCV RBC AUTO: 77.2 FL (ref 80–94)
MONOCYTES # BLD AUTO: 0.35 X10(3)/MCL (ref 0.1–1.3)
MONOCYTES NFR BLD AUTO: 5.9 %
NEUTROPHILS # BLD AUTO: 2.98 X10(3)/MCL (ref 2.1–9.2)
NEUTROPHILS NFR BLD AUTO: 50.4 %
NITRITE UR QL STRIP: NEGATIVE
NRBC BLD AUTO-RTO: 0 %
PH UR STRIP: 6 [PH]
PLATELET # BLD AUTO: 198 X10(3)/MCL (ref 130–400)
PMV BLD AUTO: 9.7 FL (ref 7.4–10.4)
PROT UR QL STRIP: NEGATIVE
RBC # BLD AUTO: 4.39 X10(6)/MCL (ref 4.2–5.4)
RBC #/AREA URNS AUTO: >100 /HPF
SP GR UR STRIP.AUTO: 1.02 (ref 1–1.03)
SPECIMEN OUTDATE: NORMAL
SQUAMOUS #/AREA URNS AUTO: ABNORMAL /HPF
UROBILINOGEN UR STRIP-ACNC: 0.2
WBC # BLD AUTO: 5.92 X10(3)/MCL (ref 4.5–11.5)
WBC #/AREA URNS AUTO: ABNORMAL /HPF
YEAST UR QL AUTO: ABNORMAL /HPF

## 2025-03-11 PROCEDURE — 81025 URINE PREGNANCY TEST: CPT | Performed by: EMERGENCY MEDICINE

## 2025-03-11 PROCEDURE — 63600175 PHARM REV CODE 636 W HCPCS: Mod: JZ,TB | Performed by: EMERGENCY MEDICINE

## 2025-03-11 PROCEDURE — 96374 THER/PROPH/DIAG INJ IV PUSH: CPT

## 2025-03-11 PROCEDURE — 86901 BLOOD TYPING SEROLOGIC RH(D): CPT | Performed by: EMERGENCY MEDICINE

## 2025-03-11 PROCEDURE — 85025 COMPLETE CBC W/AUTO DIFF WBC: CPT | Performed by: EMERGENCY MEDICINE

## 2025-03-11 PROCEDURE — 99284 EMERGENCY DEPT VISIT MOD MDM: CPT | Mod: 25

## 2025-03-11 PROCEDURE — 81003 URINALYSIS AUTO W/O SCOPE: CPT | Performed by: EMERGENCY MEDICINE

## 2025-03-11 RX ORDER — IBUPROFEN 800 MG/1
800 TABLET ORAL EVERY 8 HOURS PRN
Qty: 20 TABLET | Refills: 0 | Status: SHIPPED | OUTPATIENT
Start: 2025-03-11

## 2025-03-11 RX ORDER — KETOROLAC TROMETHAMINE 30 MG/ML
30 INJECTION, SOLUTION INTRAMUSCULAR; INTRAVENOUS
Status: COMPLETED | OUTPATIENT
Start: 2025-03-11 | End: 2025-03-11

## 2025-03-11 RX ADMIN — KETOROLAC TROMETHAMINE 30 MG: 30 INJECTION, SOLUTION INTRAMUSCULAR at 09:03

## 2025-03-11 NOTE — ED PROVIDER NOTES
Encounter Date: 3/11/2025       History     Chief Complaint   Patient presents with    Vaginal Bleeding     35-year-old female history of seizure disorder was in her usual state of health until yesterday when she had had vaginal bleeding which has become heavy today.  She also has had abdominal cramping for 2 weeks.  Last menstrual period .  Patient says she has gone through about 10 pads in the last 24 hours.  She also has clots.  Patient got concerned when she began to feel lightheaded.  Patient says she did miss her period last month but is not pregnant.  She has been taking Tylenol and ibuprofen    The history is provided by the patient.     Review of patient's allergies indicates:  No Known Allergies  Past Medical History:   Diagnosis Date    Seizures      Past Surgical History:   Procedure Laterality Date     SECTION N/A 2022    Procedure:  SECTION;  Surgeon: Andrew Merrill MD;  [facility]  Service: OB/GYN;  Laterality: N/A;    CHOLECYSTECTOMY      hemrroid surgery  2018     Family History   Problem Relation Name Age of Onset    Hyperlipidemia Mother      Colon cancer Maternal Grandmother       Social History[1]  Review of Systems   Constitutional:  Negative for chills, diaphoresis, fatigue and fever.   HENT:  Negative for congestion, drooling, ear discharge, ear pain, postnasal drip, rhinorrhea, sinus pressure, sinus pain, sore throat and tinnitus.    Eyes:  Negative for discharge.   Respiratory:  Negative for cough, chest tightness, shortness of breath and wheezing.    Cardiovascular:  Negative for chest pain and palpitations.   Gastrointestinal:  Positive for abdominal pain. Negative for diarrhea, nausea and vomiting.   Genitourinary:  Positive for vaginal bleeding. Negative for frequency, hematuria and urgency.   Musculoskeletal:  Negative for back pain, neck pain and neck stiffness.   Skin:  Negative for rash.   Neurological:  Positive for light-headedness.  Negative for syncope, weakness, numbness and headaches.   Psychiatric/Behavioral:  Negative for suicidal ideas.        Physical Exam     Initial Vitals [03/11/25 0859]   BP Pulse Resp Temp SpO2   139/86 (!) 20 (!) 2 98.3 °F (36.8 °C) 99 %      MAP       --         Physical Exam    Nursing note and vitals reviewed.  Eyes: Conjunctivae are normal.   Cardiovascular:  Normal rate.           Pulmonary/Chest: Breath sounds normal. No respiratory distress.   Abdominal: Abdomen is soft. She exhibits no distension. There is abdominal tenderness.   Musculoskeletal:         General: Normal range of motion.     Neurological: She is alert and oriented to person, place, and time.   Skin: Skin is warm. No rash noted. No erythema. No pallor.         ED Course   Procedures  Labs Reviewed   URINALYSIS, REFLEX TO URINE CULTURE - Abnormal       Result Value    Color, UA Red-orange (*)     Appearance, UA Cloudy (*)     Specific Gravity, UA 1.020      pH, UA 6.0      Protein, UA Negative      Glucose, UA Negative      Ketones, UA Negative      Blood, UA Large (*)     Bilirubin, UA Negative      Urobilinogen, UA 0.2      Nitrites, UA Negative      Leukocyte Esterase, UA Trace (*)    CBC WITH DIFFERENTIAL - Abnormal    WBC 5.92      RBC 4.39      Hgb 10.6 (*)     Hct 33.9 (*)     MCV 77.2 (*)     MCH 24.1 (*)     MCHC 31.3 (*)     RDW 15.0      Platelet 198      MPV 9.7      Neut % 50.4      Lymph % 40.5      Mono % 5.9      Eos % 2.5      Basophil % 0.5      Imm Grans % 0.2      Neut # 2.98      Lymph # 2.40      Mono # 0.35      Eos # 0.15      Baso # 0.03      Imm Gran # 0.01      NRBC% 0.0     URINALYSIS, MICROSCOPIC - Abnormal    Bacteria, UA Rare      Yeast, UA Few (*)     RBC, UA >100 (*)     WBC, UA 0-5      Squamous Epithelial Cells, UA Occasional     PREGNANCY TEST, URINE RAPID - Normal    hCG Qualitative, Urine Negative     CBC W/ AUTO DIFFERENTIAL    Narrative:     The following orders were created for panel order CBC auto  differential.  Procedure                               Abnormality         Status                     ---------                               -----------         ------                     CBC with Differential[[id number]]        Abnormal            Final result                 Please view results for these tests on the individual orders.   TYPE & SCREEN          Imaging Results    None          Medications   ketorolac injection 30 mg (30 mg Intravenous Given 3/11/25 0935)     Medical Decision Making  Medical Decision Making  Problem list/ differential diagnosis including but not limited to:  Cirrhosis, bleeding disorder, hormone replacement therapy, anticoagulant, hypothyroid, polycystic ovary syndrome, and not feel aeration, dysfunctional uterine bleeding, endometriosis, fibroids, foreign body, PID, vaginitis, IUD, neoplasm, trauma, pregnant, miscarriage, ectopic, subchorionic hemorrhage,    Patient's chronic illnesses impacting care:  none    Diagnostic test considered but not ordered:    My interpretations:      Radiology reports      Discussion of case with external qualified healthcare professionals:  Not applicable    Review of external notes( inpt, ems, NH, clinic):      Decision rules/scores:    Medications reviewed:  Ibuprofen and Tylenol  Medications ordered in the ER:  Discharge prescriptions:    Social variables possible impacting patient's healthcare:    Code status/discussion    Shared decision making:    Consideration for admission versus discharge: stable for discharge     Amount and/or Complexity of Data Reviewed  Labs: ordered. Decision-making details documented in ED Course.    Risk  Prescription drug management.               ED Course as of 03/11/25 1021   Tue Mar 11, 2025   0930 hCG Qualitative, Urine: Negative [WC]      ED Course User Index  [WC] Logan Lea MD                           Clinical Impression:  Final diagnoses:  [N93.8] Dysfunctional uterine bleeding (Primary)          ED  Disposition Condition    Discharge Stable          ED Prescriptions       Medication Sig Dispense Start Date End Date Auth. Provider    ibuprofen (ADVIL,MOTRIN) 800 MG tablet Take 1 tablet (800 mg total) by mouth every 8 (eight) hours as needed. 20 tablet 3/11/2025 -- Logan Lea MD          Follow-up Information       Follow up With Specialties Details Why Contact Info    Jamison Davila, DO Internal Medicine   [address] [place] [id number] [phone]                 [1]   Social History  Tobacco Use    Smoking status: Former    Smokeless tobacco: Former   Substance Use Topics    Alcohol use: Yes     Comment: occ    Drug use: Yes     Types: Marijuana     Comment: THC/CBD Gummies        Logan Lea MD  03/11/25 2502

## 2025-03-11 NOTE — ED TRIAGE NOTES
C/o heavy vaginal bleeding for past 2 days.self reports missed feb menses. (Last reported 1/27)   Marek told this RN that patient could go home if MRI was Negative. DC orders in and patient 
will leave after she eats lunch. Jamia instructed patient to stop by the [facility] [place] 
to  an event monitor

## (undated) DEVICE — SUT 1 36IN CHROMIC GUT CTX

## (undated) DEVICE — SUT MONOCRYL 4-0 PS-1 UND

## (undated) DEVICE — BULB SYRINGE EAR IRRIGATION

## (undated) DEVICE — BINDER ABDOM 4PANEL 12IN LG/XL

## (undated) DEVICE — ELECTRODE REM PLYHSV RETURN 9

## (undated) DEVICE — PAD SANITARY OB STERILE

## (undated) DEVICE — SUT CHROMIC GUT 2-0 CT-1 27IN

## (undated) DEVICE — SEE MEDLINE ITEM 156931

## (undated) DEVICE — SEE MEDLINE ITEM 157117

## (undated) DEVICE — SOL NACL IRR 1000ML BTL

## (undated) DEVICE — SUT 3/0 36IN COATED VICRYL

## (undated) DEVICE — SYS CLSR DERMABOND PRINEO 22CM

## (undated) DEVICE — PAD UNDERPAD 30X30

## (undated) DEVICE — SUT CTD VICRYL 1 VIL BR CTX

## (undated) DEVICE — Device

## (undated) DEVICE — SOL WATER STRL IRR 1000ML

## (undated) DEVICE — SUT PLAIN MONO 2-0 XLH 27IN

## (undated) DEVICE — CAP BABY BEANIE